# Patient Record
Sex: MALE | Race: OTHER | NOT HISPANIC OR LATINO
[De-identification: names, ages, dates, MRNs, and addresses within clinical notes are randomized per-mention and may not be internally consistent; named-entity substitution may affect disease eponyms.]

---

## 2017-09-06 ENCOUNTER — APPOINTMENT (OUTPATIENT)
Dept: UROLOGY | Facility: CLINIC | Age: 62
End: 2017-09-06

## 2017-09-25 ENCOUNTER — APPOINTMENT (OUTPATIENT)
Dept: UROLOGY | Facility: CLINIC | Age: 62
End: 2017-09-25
Payer: COMMERCIAL

## 2017-09-25 VITALS
SYSTOLIC BLOOD PRESSURE: 127 MMHG | HEART RATE: 88 BPM | DIASTOLIC BLOOD PRESSURE: 84 MMHG | TEMPERATURE: 98.7 F | WEIGHT: 150 LBS | BODY MASS INDEX: 27.6 KG/M2 | HEIGHT: 62 IN

## 2017-09-25 PROCEDURE — 99204 OFFICE O/P NEW MOD 45 MIN: CPT | Mod: 25

## 2017-09-25 PROCEDURE — 51798 US URINE CAPACITY MEASURE: CPT

## 2017-09-26 LAB
APPEARANCE: CLEAR
BACTERIA: NEGATIVE
BILIRUBIN URINE: NEGATIVE
BLOOD URINE: NEGATIVE
COLOR: YELLOW
GLUCOSE QUALITATIVE U: NORMAL MG/DL
HYALINE CASTS: 0 /LPF
KETONES URINE: NEGATIVE
LEUKOCYTE ESTERASE URINE: NEGATIVE
MICROSCOPIC-UA: NORMAL
NITRITE URINE: NEGATIVE
PH URINE: 6
PROTEIN URINE: NEGATIVE MG/DL
RED BLOOD CELLS URINE: 0 /HPF
SPECIFIC GRAVITY URINE: 1.02
SQUAMOUS EPITHELIAL CELLS: 1 /HPF
UROBILINOGEN URINE: 1 MG/DL
WHITE BLOOD CELLS URINE: 0 /HPF

## 2017-09-27 LAB — BACTERIA UR CULT: NORMAL

## 2017-10-25 ENCOUNTER — APPOINTMENT (OUTPATIENT)
Dept: UROLOGY | Facility: CLINIC | Age: 62
End: 2017-10-25
Payer: COMMERCIAL

## 2017-10-25 VITALS
TEMPERATURE: 97.6 F | WEIGHT: 150 LBS | SYSTOLIC BLOOD PRESSURE: 116 MMHG | BODY MASS INDEX: 27.6 KG/M2 | DIASTOLIC BLOOD PRESSURE: 75 MMHG | HEIGHT: 62 IN | HEART RATE: 84 BPM

## 2017-10-25 PROCEDURE — 52000 CYSTOURETHROSCOPY: CPT

## 2018-01-24 ENCOUNTER — APPOINTMENT (OUTPATIENT)
Dept: UROLOGY | Facility: CLINIC | Age: 63
End: 2018-01-24
Payer: COMMERCIAL

## 2018-01-24 VITALS — DIASTOLIC BLOOD PRESSURE: 72 MMHG | TEMPERATURE: 98.4 F | SYSTOLIC BLOOD PRESSURE: 114 MMHG | HEART RATE: 71 BPM

## 2018-01-24 DIAGNOSIS — N35.9 URETHRAL STRICTURE, UNSPECIFIED: ICD-10-CM

## 2018-01-24 PROCEDURE — 99213 OFFICE O/P EST LOW 20 MIN: CPT

## 2018-01-25 PROBLEM — N35.9: Status: ACTIVE | Noted: 2017-09-25

## 2018-01-30 RX ORDER — SILDENAFIL 100 MG/1
100 TABLET, FILM COATED ORAL
Qty: 6 | Refills: 5 | Status: ACTIVE | COMMUNITY
Start: 2018-01-24 | End: 1900-01-01

## 2018-04-13 ENCOUNTER — APPOINTMENT (OUTPATIENT)
Dept: OTOLARYNGOLOGY | Facility: CLINIC | Age: 63
End: 2018-04-13
Payer: COMMERCIAL

## 2018-04-13 PROCEDURE — 92557 COMPREHENSIVE HEARING TEST: CPT

## 2018-04-13 PROCEDURE — 92567 TYMPANOMETRY: CPT

## 2018-04-25 ENCOUNTER — APPOINTMENT (OUTPATIENT)
Dept: OTOLARYNGOLOGY | Facility: CLINIC | Age: 63
End: 2018-04-25
Payer: COMMERCIAL

## 2018-04-25 ENCOUNTER — APPOINTMENT (OUTPATIENT)
Dept: OTOLARYNGOLOGY | Facility: CLINIC | Age: 63
End: 2018-04-25

## 2018-04-25 DIAGNOSIS — H70.90 UNSPECIFIED MASTOIDITIS, UNSPECIFIED EAR: ICD-10-CM

## 2018-04-25 DIAGNOSIS — H61.23 IMPACTED CERUMEN, BILATERAL: ICD-10-CM

## 2018-04-25 PROCEDURE — 99203 OFFICE O/P NEW LOW 30 MIN: CPT | Mod: 25

## 2018-04-25 PROCEDURE — 69220 CLEAN OUT MASTOID CAVITY: CPT | Mod: LT

## 2018-04-25 PROCEDURE — 69210 REMOVE IMPACTED EAR WAX UNI: CPT | Mod: RT

## 2018-06-04 ENCOUNTER — APPOINTMENT (OUTPATIENT)
Dept: UROLOGY | Facility: CLINIC | Age: 63
End: 2018-06-04

## 2018-07-02 ENCOUNTER — APPOINTMENT (OUTPATIENT)
Dept: UROLOGY | Facility: CLINIC | Age: 63
End: 2018-07-02
Payer: COMMERCIAL

## 2018-07-02 VITALS — SYSTOLIC BLOOD PRESSURE: 124 MMHG | DIASTOLIC BLOOD PRESSURE: 76 MMHG | HEART RATE: 90 BPM | TEMPERATURE: 98.5 F

## 2018-07-02 PROCEDURE — 99213 OFFICE O/P EST LOW 20 MIN: CPT

## 2018-07-03 LAB
PSA FREE FLD-MCNC: 13.6
PSA FREE SERPL-MCNC: 0.09 NG/ML
PSA SERPL-MCNC: 0.66 NG/ML

## 2018-07-05 ENCOUNTER — RESULT REVIEW (OUTPATIENT)
Age: 63
End: 2018-07-05

## 2018-08-17 ENCOUNTER — RESULT REVIEW (OUTPATIENT)
Age: 63
End: 2018-08-17

## 2018-08-17 ENCOUNTER — APPOINTMENT (OUTPATIENT)
Dept: GASTROENTEROLOGY | Facility: CLINIC | Age: 63
End: 2018-08-17
Payer: COMMERCIAL

## 2018-08-17 PROCEDURE — 45385 COLONOSCOPY W/LESION REMOVAL: CPT | Mod: 33

## 2019-01-28 ENCOUNTER — APPOINTMENT (OUTPATIENT)
Dept: UROLOGY | Facility: CLINIC | Age: 64
End: 2019-01-28
Payer: COMMERCIAL

## 2019-01-28 VITALS — SYSTOLIC BLOOD PRESSURE: 131 MMHG | HEART RATE: 74 BPM | DIASTOLIC BLOOD PRESSURE: 73 MMHG | TEMPERATURE: 97.7 F

## 2019-01-28 DIAGNOSIS — N52.9 MALE ERECTILE DYSFUNCTION, UNSPECIFIED: ICD-10-CM

## 2019-01-28 PROCEDURE — 99213 OFFICE O/P EST LOW 20 MIN: CPT | Mod: 25

## 2019-01-28 PROCEDURE — 51798 US URINE CAPACITY MEASURE: CPT

## 2019-01-28 NOTE — ASSESSMENT
[FreeTextEntry1] : 64yo male with history of urethral stricture disease, BPH \par No stricture noted on cystoscopy in October 2017\par Normal SONG, PSA in July 2018\par Continue tamsulosin BID\par Recommend trial of Detrol 2mg daily for frequency and urgency\par Reviewed indications, side effects\par RTC 3 months

## 2019-01-28 NOTE — PHYSICAL EXAM
[General Appearance - Well Developed] : well developed [General Appearance - Well Nourished] : well nourished [Normal Appearance] : normal appearance [Well Groomed] : well groomed [General Appearance - In No Acute Distress] : no acute distress [Abdomen Soft] : soft [Abdomen Tenderness] : non-tender [Costovertebral Angle Tenderness] : no ~M costovertebral angle tenderness [FreeTextEntry1] : PVR = 0cc [Oriented To Time, Place, And Person] : oriented to person, place, and time [Affect] : the affect was normal [Mood] : the mood was normal [Not Anxious] : not anxious [Normal Station and Gait] : the gait and station were normal for the patient's age [No Focal Deficits] : no focal deficits

## 2019-01-28 NOTE — LETTER BODY
[Dear  ___] : Dear  [unfilled], [Courtesy Letter:] : I had the pleasure of seeing your patient, [unfilled], in my office today. [Please see my note below.] : Please see my note below. [Consult Closing:] : Thank you very much for allowing me to participate in the care of this patient.  If you have any questions, please do not hesitate to contact me. [Sincerely,] : Sincerely, [FreeTextEntry2] : Deandre Felix MD\par [address]\[place] [address]  [FreeTextEntry3] : Alexis Amaral MD\par Urologic Oncology\par Department of Urology\par [facility]\par \par \par [employer] at [facility] \par \par

## 2019-01-28 NOTE — HISTORY OF PRESENT ILLNESS
[FreeTextEntry1] : This is a 63yo male with complicated urological history, previously followed by Dr. Kelsey Coy. In the past year he has undergone a DVIU for idiopathic urethral stricture and right hydrocele repair. He currently complains of worsening lower urinary tract symptoms including postvoid dribbling and erectile dysfunction. He has been taking tamsulosin for the last several months which has helped his urinary symptoms but he is concerned about postvoid dribbling. \par \par 1/24/18 Here for f/u. Cysto in October was unremarkable, no stricture found. He continues to complain of urinary frequency and urgency but satisfied with tamsulosin. He is interested in treatment for erectile dysfunction. \par \par 7/02/18 Here for f/u. Doing well, he has increased tamsulosin to BID and this has helped a lot. \par \par 1/28/19 Here for f/u. Primary complaint is severe frequency. Flow is OK. Feels he is emptying well.  [Urinary Urgency] : urinary urgency [Urinary Frequency] : urinary frequency [Nocturia] : nocturia [None] : None

## 2019-01-29 LAB
APPEARANCE: CLEAR
BACTERIA: NEGATIVE
BILIRUBIN URINE: NEGATIVE
BLOOD URINE: NEGATIVE
COLOR: YELLOW
GLUCOSE QUALITATIVE U: NEGATIVE MG/DL
HYALINE CASTS: 3 /LPF
KETONES URINE: NEGATIVE
LEUKOCYTE ESTERASE URINE: NEGATIVE
MICROSCOPIC-UA: NORMAL
NITRITE URINE: NEGATIVE
PH URINE: 5
PROTEIN URINE: NEGATIVE MG/DL
RED BLOOD CELLS URINE: 2 /HPF
SPECIFIC GRAVITY URINE: 1.02
SQUAMOUS EPITHELIAL CELLS: 1 /HPF
UROBILINOGEN URINE: NEGATIVE MG/DL
WHITE BLOOD CELLS URINE: 1 /HPF

## 2019-01-30 LAB — BACTERIA UR CULT: NORMAL

## 2019-04-29 ENCOUNTER — APPOINTMENT (OUTPATIENT)
Dept: UROLOGY | Facility: CLINIC | Age: 64
End: 2019-04-29
Payer: COMMERCIAL

## 2019-04-29 VITALS — SYSTOLIC BLOOD PRESSURE: 124 MMHG | TEMPERATURE: 97.4 F | HEART RATE: 87 BPM | DIASTOLIC BLOOD PRESSURE: 78 MMHG

## 2019-04-29 DIAGNOSIS — N99.114 POSTPROCEDURAL URETHRAL STRICTURE, MALE, UNSPECIFIED: ICD-10-CM

## 2019-04-29 PROCEDURE — 99213 OFFICE O/P EST LOW 20 MIN: CPT | Mod: 25

## 2019-04-29 PROCEDURE — 51798 US URINE CAPACITY MEASURE: CPT

## 2019-04-29 RX ORDER — TAMSULOSIN HYDROCHLORIDE 0.4 MG/1
0.4 CAPSULE ORAL
Qty: 180 | Refills: 3 | Status: ACTIVE | COMMUNITY
Start: 2017-10-25 | End: 1900-01-01

## 2019-04-29 NOTE — HISTORY OF PRESENT ILLNESS
[FreeTextEntry1] : This is a 61yo male with complicated urological history, previously followed by Dr. Kelsey Coy. In the past year he has undergone a DVIU for idiopathic urethral stricture and right hydrocele repair. He currently complains of worsening lower urinary tract symptoms including postvoid dribbling and erectile dysfunction. He has been taking tamsulosin for the last several months which has helped his urinary symptoms but he is concerned about postvoid dribbling. \par \par 1/24/18 Here for f/u. Cysto in October was unremarkable, no stricture found. He continues to complain of urinary frequency and urgency but satisfied with tamsulosin. He is interested in treatment for erectile dysfunction. \par \par 7/02/18 Here for f/u. Doing well, he has increased tamsulosin to BID and this has helped a lot. \par \par 1/28/19 Here for f/u. Primary complaint is severe frequency. Flow is OK. Feels he is emptying well. \par \par 4/29/19 Here for f/u. Primary complaint is nocturia, flow is OK. Emptying well.  [Urinary Urgency] : urinary urgency [Nocturia] : nocturia [None] : None

## 2019-04-29 NOTE — ASSESSMENT
[FreeTextEntry1] : 64yo male with history of urethral stricture disease, BPH \par No stricture noted on cystoscopy in October 2017\par Normal SONG, PSA in July 2018\par Continue tamsulosin BID\par Detrol 2mg daily helped daytime symptoms but still with nocturia\par Will increase Detrol to 4mg daily\par RTC 3 months

## 2019-04-29 NOTE — LETTER BODY
[Dear  ___] : Dear  [unfilled], [Courtesy Letter:] : I had the pleasure of seeing your patient, [unfilled], in my office today. [Please see my note below.] : Please see my note below. [Consult Closing:] : Thank you very much for allowing me to participate in the care of this patient.  If you have any questions, please do not hesitate to contact me. [Sincerely,] : Sincerely, [FreeTextEntry2] : Deandre Felix MD\par [address]\[place] [address]  [FreeTextEntry3] : Alexis Amaral MD\par Urologic Oncology\par Department of Urology\par [facility]\par \par \par [employer] at [facility] \par \par

## 2019-04-29 NOTE — PHYSICAL EXAM
[General Appearance - Well Developed] : well developed [General Appearance - Well Nourished] : well nourished [Normal Appearance] : normal appearance [Well Groomed] : well groomed [General Appearance - In No Acute Distress] : no acute distress [Abdomen Soft] : soft [Abdomen Tenderness] : non-tender [Costovertebral Angle Tenderness] : no ~M costovertebral angle tenderness [FreeTextEntry1] : PVR = 14cc [Oriented To Time, Place, And Person] : oriented to person, place, and time [Affect] : the affect was normal [Mood] : the mood was normal [Not Anxious] : not anxious [Normal Station and Gait] : the gait and station were normal for the patient's age [No Focal Deficits] : no focal deficits

## 2019-07-29 ENCOUNTER — APPOINTMENT (OUTPATIENT)
Dept: UROLOGY | Facility: CLINIC | Age: 64
End: 2019-07-29
Payer: COMMERCIAL

## 2019-07-29 VITALS — DIASTOLIC BLOOD PRESSURE: 84 MMHG | TEMPERATURE: 98.3 F | HEART RATE: 82 BPM | SYSTOLIC BLOOD PRESSURE: 125 MMHG

## 2019-07-29 PROCEDURE — 51798 US URINE CAPACITY MEASURE: CPT

## 2019-07-29 PROCEDURE — 99213 OFFICE O/P EST LOW 20 MIN: CPT | Mod: 25

## 2019-07-29 NOTE — ASSESSMENT
[FreeTextEntry1] : 64yo male with history of urethral stricture disease, BPH \par No stricture noted on cystoscopy in October 2017\par Normal SONG, PSA in July 2018\par Continue tamsulosin BID\par Continue Detrol to 4mg daily but recommend he try taking medication earlier in the day \par RTC 3 months

## 2019-07-29 NOTE — LETTER BODY
[Dear  ___] : Dear  [unfilled], [Courtesy Letter:] : I had the pleasure of seeing your patient, [unfilled], in my office today. [Please see my note below.] : Please see my note below. [Consult Closing:] : Thank you very much for allowing me to participate in the care of this patient.  If you have any questions, please do not hesitate to contact me. [Sincerely,] : Sincerely, [FreeTextEntry2] : Deandre Felix MD\par [address]\[place] [address]  [FreeTextEntry3] : Alexis Amaral MD\par Urologic Oncology\par Department of Urology\par [facility]\par \par \par [employer] [facility] \par \par

## 2019-07-29 NOTE — PHYSICAL EXAM
[General Appearance - Well Nourished] : well nourished [General Appearance - Well Developed] : well developed [Normal Appearance] : normal appearance [Well Groomed] : well groomed [General Appearance - In No Acute Distress] : no acute distress [Abdomen Soft] : soft [Abdomen Tenderness] : non-tender [FreeTextEntry1] : PVR = 0cc [Costovertebral Angle Tenderness] : no ~M costovertebral angle tenderness [Oriented To Time, Place, And Person] : oriented to person, place, and time [Affect] : the affect was normal [Not Anxious] : not anxious [Mood] : the mood was normal [Normal Station and Gait] : the gait and station were normal for the patient's age [No Focal Deficits] : no focal deficits

## 2019-07-29 NOTE — HISTORY OF PRESENT ILLNESS
[FreeTextEntry1] : This is a 61yo male with complicated urological history, previously followed by Dr. Kelsey Coy. In the past year he has undergone a DVIU for idiopathic urethral stricture and right hydrocele repair. He currently complains of worsening lower urinary tract symptoms including postvoid dribbling and erectile dysfunction. He has been taking tamsulosin for the last several months which has helped his urinary symptoms but he is concerned about postvoid dribbling. \par \par 1/24/18 Here for f/u. Cysto in October was unremarkable, no stricture found. He continues to complain of urinary frequency and urgency but satisfied with tamsulosin. He is interested in treatment for erectile dysfunction. \par \par 7/02/18 Here for f/u. Doing well, he has increased tamsulosin to BID and this has helped a lot. \par \par 1/28/19 Here for f/u. Primary complaint is severe frequency. Flow is OK. Feels he is emptying well. \par \par 4/29/19 Here for f/u. Primary complaint is nocturia, flow is OK. Emptying well. \par \par 7/29/19 Here for f/u. Primary complaint is still nocturia, day time symptoms are well controlled. He has been taking the Detrol before bedtime.  [Urinary Urgency] : no urinary urgency [Urinary Frequency] : no urinary frequency [Nocturia] : nocturia [Straining] : no straining [Weak Stream] : no weak stream [Intermittency] : no intermittency [None] : None

## 2019-10-28 ENCOUNTER — APPOINTMENT (OUTPATIENT)
Dept: UROLOGY | Facility: CLINIC | Age: 64
End: 2019-10-28
Payer: COMMERCIAL

## 2019-10-28 VITALS — SYSTOLIC BLOOD PRESSURE: 99 MMHG | TEMPERATURE: 98.1 F | DIASTOLIC BLOOD PRESSURE: 63 MMHG | HEART RATE: 81 BPM

## 2019-10-28 PROCEDURE — 99213 OFFICE O/P EST LOW 20 MIN: CPT

## 2019-10-28 RX ORDER — TOLTERODINE TARTRATE 4 MG/1
4 CAPSULE, EXTENDED RELEASE ORAL
Qty: 90 | Refills: 3 | Status: DISCONTINUED | COMMUNITY
Start: 2019-01-28 | End: 2019-10-28

## 2019-10-29 LAB
APPEARANCE: CLEAR
BACTERIA UR CULT: NORMAL
BACTERIA: NEGATIVE
BILIRUBIN URINE: NEGATIVE
BLOOD URINE: NEGATIVE
COLOR: YELLOW
GLUCOSE QUALITATIVE U: NEGATIVE
HYALINE CASTS: 0 /LPF
KETONES URINE: NEGATIVE
LEUKOCYTE ESTERASE URINE: NEGATIVE
MICROSCOPIC-UA: NORMAL
NITRITE URINE: NEGATIVE
PH URINE: 5.5
PROTEIN URINE: NORMAL
RED BLOOD CELLS URINE: 1 /HPF
SPECIFIC GRAVITY URINE: 1.03
SQUAMOUS EPITHELIAL CELLS: 1 /HPF
UROBILINOGEN URINE: NORMAL
WHITE BLOOD CELLS URINE: 1 /HPF

## 2019-10-29 NOTE — LETTER BODY
[Dear  ___] : Dear  [unfilled], [Courtesy Letter:] : I had the pleasure of seeing your patient, [unfilled], in my office today. [Please see my note below.] : Please see my note below. [Consult Closing:] : Thank you very much for allowing me to participate in the care of this patient.  If you have any questions, please do not hesitate to contact me. [Sincerely,] : Sincerely, [FreeTextEntry2] : Deandre Felix MD\par [address]\[place] [address]  [FreeTextEntry3] : Alexis Amaral MD\par Urologic Oncology\par Department of Urology\par [facility]\par \par \par [employer] [facility] \par \par

## 2019-10-29 NOTE — ASSESSMENT
[FreeTextEntry1] : 63yo male with history of urethral stricture disease, BPH \par No stricture noted on cystoscopy in October 2017\par Normal SONG, PSA in July 2018\par Continue tamsulosin\par Recommend stop Detrol. Trial Toviaz 8mg \par Reviewed management options for dry mouth \par F/u 3 months for reevaluation

## 2019-10-29 NOTE — HISTORY OF PRESENT ILLNESS
[FreeTextEntry1] : This is a 63yo male with complicated urological history, previously followed by Dr. Kelsey Coy. In the past year he has undergone a DVIU for idiopathic urethral stricture and right hydrocele repair. He currently complains of worsening lower urinary tract symptoms including postvoid dribbling and erectile dysfunction. He has been taking tamsulosin for the last several months which has helped his urinary symptoms but he is concerned about postvoid dribbling. \par \par 1/24/18 Here for f/u. Cysto in October was unremarkable, no stricture found. He continues to complain of urinary frequency and urgency but satisfied with tamsulosin. He is interested in treatment for erectile dysfunction. \par \par 7/02/18 Here for f/u. Doing well, he has increased tamsulosin to BID and this has helped a lot. \par \par 1/28/19 Here for f/u. Primary complaint is severe frequency. Flow is OK. Feels he is emptying well. \par \par 4/29/19 Here for f/u. Primary complaint is nocturia, flow is OK. Emptying well. \par \par 7/29/19 Here for f/u. Primary complaint is still nocturia, day time symptoms are well controlled. He has been taking the Detrol before bedtime. \par \par 10/28/19 Here for f/u. Continues to complain of nocturia and frequency. Cannot tolerate Detrol due to dry mouth.  [Urinary Urgency] : no urinary urgency [Urinary Frequency] : urinary frequency [Nocturia] : nocturia [Straining] : no straining [Weak Stream] : no weak stream [Intermittency] : no intermittency [None] : None

## 2019-10-29 NOTE — PHYSICAL EXAM
[General Appearance - Well Developed] : well developed [General Appearance - Well Nourished] : well nourished [Normal Appearance] : normal appearance [Well Groomed] : well groomed [General Appearance - In No Acute Distress] : no acute distress [Abdomen Soft] : soft [Abdomen Tenderness] : non-tender [Costovertebral Angle Tenderness] : no ~M costovertebral angle tenderness [Urinary Bladder Findings] : the bladder was normal on palpation [Oriented To Time, Place, And Person] : oriented to person, place, and time [Affect] : the affect was normal [Mood] : the mood was normal [Not Anxious] : not anxious [Normal Station and Gait] : the gait and station were normal for the patient's age [No Focal Deficits] : no focal deficits

## 2020-01-13 ENCOUNTER — APPOINTMENT (OUTPATIENT)
Dept: UROLOGY | Facility: CLINIC | Age: 65
End: 2020-01-13
Payer: COMMERCIAL

## 2020-01-13 VITALS — HEART RATE: 78 BPM | SYSTOLIC BLOOD PRESSURE: 108 MMHG | TEMPERATURE: 97.4 F | DIASTOLIC BLOOD PRESSURE: 67 MMHG

## 2020-01-13 PROCEDURE — 99213 OFFICE O/P EST LOW 20 MIN: CPT

## 2020-01-13 RX ORDER — FESOTERODINE FUMARATE 8 MG/1
8 TABLET, FILM COATED, EXTENDED RELEASE ORAL
Qty: 30 | Refills: 5 | Status: DISCONTINUED | COMMUNITY
Start: 2019-10-28 | End: 2020-01-13

## 2020-01-13 NOTE — ASSESSMENT
[FreeTextEntry1] : 65yo male with history of urethral stricture disease, BPH \par No stricture noted on cystoscopy in October 2017\par Normal PVRs\par Primarily with irritative symptoms\par Normal SONG, PSA in July 2018\par Continue tamsulosin\par Continue Toviaz 8mg \par Symptoms have mostly been refractory to medications\par Recommend UDS testing. Will refer to Dr. Muniz for further workup

## 2020-01-13 NOTE — HISTORY OF PRESENT ILLNESS
[FreeTextEntry1] : This is a 61yo male with complicated urological history, previously followed by Dr. Kelsey Coy. In the past year he has undergone a DVIU for idiopathic urethral stricture and right hydrocele repair. He currently complains of worsening lower urinary tract symptoms including postvoid dribbling and erectile dysfunction. He has been taking tamsulosin for the last several months which has helped his urinary symptoms but he is concerned about postvoid dribbling. \par \par 1/24/18 Here for f/u. Cysto in October was unremarkable, no stricture found. He continues to complain of urinary frequency and urgency but satisfied with tamsulosin. He is interested in treatment for erectile dysfunction. \par \par 7/02/18 Here for f/u. Doing well, he has increased tamsulosin to BID and this has helped a lot. \par \par 1/28/19 Here for f/u. Primary complaint is severe frequency. Flow is OK. Feels he is emptying well. \par \par 4/29/19 Here for f/u. Primary complaint is nocturia, flow is OK. Emptying well. \par \par 7/29/19 Here for f/u. Primary complaint is still nocturia, day time symptoms are well controlled. He has been taking the Detrol before bedtime. \par \par 10/28/19 Here for f/u. Continues to complain of nocturia and frequency. Cannot tolerate Detrol due to dry mouth. \par \par 1/13/20 Here for f/u. Modest benefit from Toviaz but still with bothersome frequency and nocturia.  [Urinary Urgency] : no urinary urgency [Urinary Frequency] : urinary frequency [Nocturia] : nocturia [Straining] : no straining [Weak Stream] : no weak stream [Intermittency] : no intermittency [None] : None

## 2020-01-13 NOTE — LETTER BODY
[Dear  ___] : Dear  [unfilled], [Please see my note below.] : Please see my note below. [Courtesy Letter:] : I had the pleasure of seeing your patient, [unfilled], in my office today. [Consult Closing:] : Thank you very much for allowing me to participate in the care of this patient.  If you have any questions, please do not hesitate to contact me. [Sincerely,] : Sincerely, [FreeTextEntry2] : Deandre Felix MD\par [address]\[place] [address]  [FreeTextEntry3] : Alexis Amaral MD\par Urologic Oncology\par Department of Urology\par [facility]\par \par \par [employer] at [facility] \par \par

## 2020-01-13 NOTE — PHYSICAL EXAM
[General Appearance - Well Nourished] : well nourished [General Appearance - Well Developed] : well developed [Well Groomed] : well groomed [General Appearance - In No Acute Distress] : no acute distress [Normal Appearance] : normal appearance [Abdomen Tenderness] : non-tender [Abdomen Soft] : soft [Costovertebral Angle Tenderness] : no ~M costovertebral angle tenderness [Oriented To Time, Place, And Person] : oriented to person, place, and time [Urinary Bladder Findings] : the bladder was normal on palpation [Mood] : the mood was normal [Not Anxious] : not anxious [Affect] : the affect was normal [Normal Station and Gait] : the gait and station were normal for the patient's age [No Focal Deficits] : no focal deficits

## 2020-01-14 LAB
APPEARANCE: CLEAR
BACTERIA: NEGATIVE
BILIRUBIN URINE: NEGATIVE
BLOOD URINE: NEGATIVE
COLOR: YELLOW
GLUCOSE QUALITATIVE U: NEGATIVE
HYALINE CASTS: 0 /LPF
KETONES URINE: NEGATIVE
LEUKOCYTE ESTERASE URINE: NEGATIVE
MICROSCOPIC-UA: NORMAL
NITRITE URINE: NEGATIVE
PH URINE: 5.5
PROTEIN URINE: NORMAL
RED BLOOD CELLS URINE: 1 /HPF
SPECIFIC GRAVITY URINE: 1.03
SQUAMOUS EPITHELIAL CELLS: 1 /HPF
UROBILINOGEN URINE: NORMAL
WHITE BLOOD CELLS URINE: 0 /HPF

## 2020-01-15 LAB — BACTERIA UR CULT: NORMAL

## 2020-01-31 ENCOUNTER — APPOINTMENT (OUTPATIENT)
Dept: UROLOGY | Facility: CLINIC | Age: 65
End: 2020-01-31
Payer: COMMERCIAL

## 2020-01-31 DIAGNOSIS — Z00.00 ENCOUNTER FOR GENERAL ADULT MEDICAL EXAMINATION W/OUT ABNORMAL FINDINGS: ICD-10-CM

## 2020-01-31 DIAGNOSIS — N40.1 BENIGN PROSTATIC HYPERPLASIA WITH LOWER URINARY TRACT SYMPMS: ICD-10-CM

## 2020-01-31 DIAGNOSIS — N13.8 BENIGN PROSTATIC HYPERPLASIA WITH LOWER URINARY TRACT SYMPMS: ICD-10-CM

## 2020-01-31 PROCEDURE — 99214 OFFICE O/P EST MOD 30 MIN: CPT

## 2020-02-03 LAB
BILIRUB UR QL STRIP: NORMAL
CLARITY UR: CLEAR
COLLECTION METHOD: NORMAL
GLUCOSE UR-MCNC: NORMAL
HCG UR QL: 2 EU/DL
HGB UR QL STRIP.AUTO: NORMAL
KETONES UR-MCNC: NORMAL
LEUKOCYTE ESTERASE UR QL STRIP: NORMAL
NITRITE UR QL STRIP: NORMAL
PH UR STRIP: 7
PROT UR STRIP-MCNC: NORMAL
SP GR UR STRIP: 1.02

## 2020-03-13 ENCOUNTER — RESULT CHARGE (OUTPATIENT)
Age: 65
End: 2020-03-13

## 2020-03-13 ENCOUNTER — APPOINTMENT (OUTPATIENT)
Dept: UROLOGY | Facility: CLINIC | Age: 65
End: 2020-03-13
Payer: COMMERCIAL

## 2020-03-13 VITALS — DIASTOLIC BLOOD PRESSURE: 66 MMHG | SYSTOLIC BLOOD PRESSURE: 108 MMHG | TEMPERATURE: 98.1 F

## 2020-03-13 LAB
BILIRUB UR QL STRIP: NORMAL
CLARITY UR: CLEAR
COLLECTION METHOD: NORMAL
GLUCOSE UR-MCNC: NORMAL
HCG UR QL: 0.2 EU/DL
HGB UR QL STRIP.AUTO: NORMAL
KETONES UR-MCNC: NORMAL
LEUKOCYTE ESTERASE UR QL STRIP: NORMAL
NITRITE UR QL STRIP: NORMAL
PH UR STRIP: 5.5
PROT UR STRIP-MCNC: NORMAL
SP GR UR STRIP: 1.02

## 2020-03-13 PROCEDURE — 51797 INTRAABDOMINAL PRESSURE TEST: CPT

## 2020-03-13 PROCEDURE — 51728 CYSTOMETROGRAM W/VP: CPT

## 2020-03-13 PROCEDURE — 51741 ELECTRO-UROFLOWMETRY FIRST: CPT

## 2020-03-13 PROCEDURE — 52001 CYSTO W/IRRG&EVAC MLT CLOTS: CPT

## 2020-03-13 PROCEDURE — 51784 ANAL/URINARY MUSCLE STUDY: CPT

## 2020-03-20 ENCOUNTER — APPOINTMENT (OUTPATIENT)
Dept: UROLOGY | Facility: CLINIC | Age: 65
End: 2020-03-20

## 2020-04-01 ENCOUNTER — APPOINTMENT (OUTPATIENT)
Dept: UROLOGY | Facility: CLINIC | Age: 65
End: 2020-04-01
Payer: COMMERCIAL

## 2020-04-01 PROCEDURE — 99214 OFFICE O/P EST MOD 30 MIN: CPT | Mod: 95

## 2020-05-27 ENCOUNTER — APPOINTMENT (OUTPATIENT)
Dept: UROLOGY | Facility: CLINIC | Age: 65
End: 2020-05-27

## 2020-05-29 ENCOUNTER — APPOINTMENT (OUTPATIENT)
Dept: UROLOGY | Facility: CLINIC | Age: 65
End: 2020-05-29
Payer: COMMERCIAL

## 2020-05-29 VITALS — SYSTOLIC BLOOD PRESSURE: 104 MMHG | DIASTOLIC BLOOD PRESSURE: 68 MMHG | HEART RATE: 73 BPM

## 2020-05-29 VITALS — HEIGHT: 62 IN | TEMPERATURE: 97.1 F

## 2020-05-29 PROCEDURE — 99213 OFFICE O/P EST LOW 20 MIN: CPT

## 2020-06-26 ENCOUNTER — APPOINTMENT (OUTPATIENT)
Dept: UROLOGY | Facility: CLINIC | Age: 65
End: 2020-06-26

## 2020-09-09 ENCOUNTER — LABORATORY RESULT (OUTPATIENT)
Age: 65
End: 2020-09-09

## 2020-09-10 ENCOUNTER — TRANSCRIPTION ENCOUNTER (OUTPATIENT)
Age: 65
End: 2020-09-10

## 2020-09-10 LAB — SARS-COV-2 N GENE NPH QL NAA+PROBE: NOT DETECTED

## 2020-09-11 ENCOUNTER — APPOINTMENT (OUTPATIENT)
Dept: UROLOGY | Facility: AMBULATORY SURGERY CENTER | Age: 65
End: 2020-09-11

## 2020-09-11 ENCOUNTER — OUTPATIENT (OUTPATIENT)
Dept: OUTPATIENT SERVICES | Facility: HOSPITAL | Age: 65
LOS: 1 days | Discharge: HOME | End: 2020-09-11
Payer: COMMERCIAL

## 2020-09-11 DIAGNOSIS — Z98.89 OTHER SPECIFIED POSTPROCEDURAL STATES: Chronic | ICD-10-CM

## 2020-09-11 PROCEDURE — 52287 CYSTOSCOPY CHEMODENERVATION: CPT

## 2020-09-18 ENCOUNTER — APPOINTMENT (OUTPATIENT)
Dept: UROLOGY | Facility: CLINIC | Age: 65
End: 2020-09-18
Payer: COMMERCIAL

## 2020-09-18 VITALS — SYSTOLIC BLOOD PRESSURE: 121 MMHG | TEMPERATURE: 98.1 F | HEART RATE: 87 BPM | DIASTOLIC BLOOD PRESSURE: 80 MMHG

## 2020-09-18 PROCEDURE — 99213 OFFICE O/P EST LOW 20 MIN: CPT

## 2020-09-25 ENCOUNTER — APPOINTMENT (OUTPATIENT)
Dept: OTOLARYNGOLOGY | Facility: CLINIC | Age: 65
End: 2020-09-25
Payer: COMMERCIAL

## 2020-09-25 PROCEDURE — 92557 COMPREHENSIVE HEARING TEST: CPT

## 2020-09-25 PROCEDURE — 92550 TYMPANOMETRY & REFLEX THRESH: CPT

## 2020-10-23 ENCOUNTER — APPOINTMENT (OUTPATIENT)
Dept: UROLOGY | Facility: CLINIC | Age: 65
End: 2020-10-23
Payer: COMMERCIAL

## 2020-10-23 VITALS
HEART RATE: 76 BPM | SYSTOLIC BLOOD PRESSURE: 122 MMHG | TEMPERATURE: 97.6 F | BODY MASS INDEX: 34.23 KG/M2 | WEIGHT: 186 LBS | DIASTOLIC BLOOD PRESSURE: 83 MMHG | OXYGEN SATURATION: 96 % | HEIGHT: 62 IN

## 2020-10-23 PROCEDURE — 99072 ADDL SUPL MATRL&STAF TM PHE: CPT

## 2020-10-23 PROCEDURE — 99213 OFFICE O/P EST LOW 20 MIN: CPT

## 2020-10-23 NOTE — ASSESSMENT
[FreeTextEntry1] : \par \par Impression/plan: 65-year-old gentleman who is 1 week status post botulinum toxin injection into the bladder 100 units, nocturia resolved, 50% improvement in daytime frequency. \par \par 1. F/u in 6 months tentatively for repeat Botox, he would like to consider higher dose.

## 2020-10-23 NOTE — HISTORY OF PRESENT ILLNESS
[FreeTextEntry1] : 65-year-old gentleman who is 1 week status post botulinum toxin injection into the bladder 100 units. He states his nighttime voiding has completely resolved. He does have 50% improvement in daytime frequency. Postvoid residual in the office last visit was 71 mL. \par

## 2020-10-23 NOTE — PHYSICAL EXAM
[General Appearance - Well Developed] : well developed [General Appearance - Well Nourished] : well nourished [Normal Appearance] : normal appearance [Well Groomed] : well groomed [General Appearance - In No Acute Distress] : no acute distress [Edema] : no peripheral edema [] : no respiratory distress [Respiration, Rhythm And Depth] : normal respiratory rhythm and effort [Exaggerated Use Of Accessory Muscles For Inspiration] : no accessory muscle use [Oriented To Time, Place, And Person] : oriented to person, place, and time [Normal Station and Gait] : the gait and station were normal for the patient's age

## 2021-04-23 ENCOUNTER — APPOINTMENT (OUTPATIENT)
Dept: UROLOGY | Facility: CLINIC | Age: 66
End: 2021-04-23
Payer: COMMERCIAL

## 2021-04-23 ENCOUNTER — APPOINTMENT (OUTPATIENT)
Dept: UROLOGY | Facility: CLINIC | Age: 66
End: 2021-04-23

## 2021-04-23 VITALS — TEMPERATURE: 97.3 F | HEART RATE: 60 BPM | DIASTOLIC BLOOD PRESSURE: 81 MMHG | SYSTOLIC BLOOD PRESSURE: 125 MMHG

## 2021-04-23 PROCEDURE — 99214 OFFICE O/P EST MOD 30 MIN: CPT

## 2021-04-23 PROCEDURE — 99072 ADDL SUPL MATRL&STAF TM PHE: CPT

## 2021-04-30 ENCOUNTER — NON-APPOINTMENT (OUTPATIENT)
Age: 66
End: 2021-04-30

## 2021-04-30 DIAGNOSIS — N39.0 URINARY TRACT INFECTION, SITE NOT SPECIFIED: ICD-10-CM

## 2021-04-30 LAB — BACTERIA UR CULT: ABNORMAL

## 2021-04-30 RX ORDER — SULFAMETHOXAZOLE AND TRIMETHOPRIM 800; 160 MG/1; MG/1
800-160 TABLET ORAL
Qty: 6 | Refills: 0 | Status: COMPLETED | COMMUNITY
Start: 2021-04-30 | End: 2021-05-09

## 2021-05-04 ENCOUNTER — APPOINTMENT (OUTPATIENT)
Dept: UROLOGY | Facility: AMBULATORY SURGERY CENTER | Age: 66
End: 2021-05-04

## 2021-05-07 ENCOUNTER — LABORATORY RESULT (OUTPATIENT)
Age: 66
End: 2021-05-07

## 2021-05-09 ENCOUNTER — TRANSCRIPTION ENCOUNTER (OUTPATIENT)
Age: 66
End: 2021-05-09

## 2021-05-10 ENCOUNTER — OUTPATIENT (OUTPATIENT)
Dept: OUTPATIENT SERVICES | Facility: HOSPITAL | Age: 66
LOS: 1 days | Discharge: HOME | End: 2021-05-10
Payer: COMMERCIAL

## 2021-05-10 ENCOUNTER — APPOINTMENT (OUTPATIENT)
Dept: UROLOGY | Facility: AMBULATORY SURGERY CENTER | Age: 66
End: 2021-05-10

## 2021-05-10 DIAGNOSIS — Z98.89 OTHER SPECIFIED POSTPROCEDURAL STATES: Chronic | ICD-10-CM

## 2021-05-10 PROCEDURE — 52287 CYSTOSCOPY CHEMODENERVATION: CPT

## 2021-05-12 ENCOUNTER — APPOINTMENT (OUTPATIENT)
Dept: UROLOGY | Facility: CLINIC | Age: 66
End: 2021-05-12
Payer: COMMERCIAL

## 2021-05-12 VITALS — HEART RATE: 69 BPM | DIASTOLIC BLOOD PRESSURE: 73 MMHG | TEMPERATURE: 97.2 F | SYSTOLIC BLOOD PRESSURE: 123 MMHG

## 2021-05-12 PROCEDURE — 99024 POSTOP FOLLOW-UP VISIT: CPT

## 2021-05-12 NOTE — HISTORY OF PRESENT ILLNESS
[FreeTextEntry1] : 64 yo gentleman 2 days s/p Botox injection, post-op urinary retention. Fill and pull performed, 150 ml instilled, voided 120 ml, PVR 30 ml. F/u in 1 week for PVR.

## 2021-05-19 ENCOUNTER — APPOINTMENT (OUTPATIENT)
Dept: UROLOGY | Facility: CLINIC | Age: 66
End: 2021-05-19
Payer: COMMERCIAL

## 2021-05-19 VITALS — TEMPERATURE: 94 F | DIASTOLIC BLOOD PRESSURE: 69 MMHG | SYSTOLIC BLOOD PRESSURE: 110 MMHG | HEART RATE: 76 BPM

## 2021-05-19 PROCEDURE — 99213 OFFICE O/P EST LOW 20 MIN: CPT

## 2021-05-19 NOTE — HISTORY OF PRESENT ILLNESS
[FreeTextEntry1] : 65 year-old male s/p 200 units Botox injection 5/10 present to the office for post-op visit. Patient reports relief of daytime frequency. He states that he goes to the bathroom 3x during the day and on average 4x during the night. Overall patient is happy with results thus far.  \par \par PVR - 149 ml\par

## 2021-05-19 NOTE — ASSESSMENT
[FreeTextEntry1] : \par \par Impression/plan: 65 year-old male s/p 200 units Botox injection 5/10 present to the office for post-op visit, happy with results thus far for OAB. \par \par 1. F/u 6-9 months to assess OAB symptoms. \par 2. Reduce nighttime fluid intake. \par

## 2021-05-19 NOTE — LETTER BODY
[Dear  ___] : Dear  [unfilled], [Consult Letter:] : I had the pleasure of evaluating your patient, [unfilled]. [Please see my note below.] : Please see my note below. [Consult Closing:] : Thank you very much for allowing me to participate in the care of this patient.  If you have any questions, please do not hesitate to contact me. [Sincerely,] : Sincerely, [FreeTextEntry3] : Neha Muniz MD\par System Director [facility]\par Department of Urology\par [facility] \par   at [facility]\par  of Urology\par [facility] [employer] [facility]\par

## 2021-09-01 ENCOUNTER — APPOINTMENT (OUTPATIENT)
Dept: UROLOGY | Facility: CLINIC | Age: 66
End: 2021-09-01
Payer: COMMERCIAL

## 2021-09-01 VITALS — SYSTOLIC BLOOD PRESSURE: 149 MMHG | HEART RATE: 60 BPM | DIASTOLIC BLOOD PRESSURE: 80 MMHG | TEMPERATURE: 98 F

## 2021-09-01 PROCEDURE — 99214 OFFICE O/P EST MOD 30 MIN: CPT

## 2021-09-01 NOTE — LETTER BODY
[Dear  ___] : Dear  [unfilled], [Courtesy Letter:] : I had the pleasure of seeing your patient, [unfilled], in my office today. [Please see my note below.] : Please see my note below. [Consult Closing:] : Thank you very much for allowing me to participate in the care of this patient.  If you have any questions, please do not hesitate to contact me. [Sincerely,] : Sincerely, [FreeTextEntry3] : Neha Muniz MD\par System Director [facility]\par Department of Urology\par [facility] \par   at [facility]\par  of Urology\par [facility] [employer] at [facility]\par

## 2021-09-01 NOTE — ASSESSMENT
[FreeTextEntry1] : \par \par Impression/plan: 66 year-old male s/p 200 units Botox injection 5/10 present to the office with recurrence of daytime freq, urgency and nocturia x 3-5.\par \par 1. Options for management of the patient's overactive bladder and incontinence discussed at length. These included medical therapy, behavioral modification, bladder retraining, and surgical options. Surgical options for third line therapies reviewed included injection of botulinum toxin versus sacral nerve stimulation therapy. The patient has decided to move forward with sacral nerve stimulation. RBAPC of this procedure discussed at length. Risks including but not limited to infection, bleeding, pain, persistence of voiding dysfunction, nerve damage, lack of efficacy discussed with the patient at length. After above discussed and all questions answered the patient decided to move forward with this procedure. \par \par

## 2021-09-03 LAB — BACTERIA UR CULT: NORMAL

## 2021-10-14 ENCOUNTER — LABORATORY RESULT (OUTPATIENT)
Age: 66
End: 2021-10-14

## 2021-10-14 ENCOUNTER — APPOINTMENT (OUTPATIENT)
Dept: INTERNAL MEDICINE | Facility: CLINIC | Age: 66
End: 2021-10-14
Payer: COMMERCIAL

## 2021-10-14 ENCOUNTER — NON-APPOINTMENT (OUTPATIENT)
Age: 66
End: 2021-10-14

## 2021-10-14 VITALS
SYSTOLIC BLOOD PRESSURE: 110 MMHG | BODY MASS INDEX: 32.02 KG/M2 | DIASTOLIC BLOOD PRESSURE: 70 MMHG | TEMPERATURE: 97.4 F | HEIGHT: 62 IN | OXYGEN SATURATION: 95 % | HEART RATE: 74 BPM | WEIGHT: 174 LBS

## 2021-10-14 PROCEDURE — 99203 OFFICE O/P NEW LOW 30 MIN: CPT

## 2021-10-14 RX ORDER — BISOPROLOL FUMARATE AND HYDROCHLOROTHIAZIDE 2.5; 6.25 MG/1; MG/1
2.5-6.25 TABLET, FILM COATED ORAL DAILY
Refills: 0 | Status: ACTIVE | COMMUNITY
Start: 2021-10-14

## 2021-10-14 RX ORDER — AMLODIPINE BESYLATE 10 MG/1
10 TABLET ORAL DAILY
Qty: 1 | Refills: 1 | Status: ACTIVE | COMMUNITY
Start: 2021-10-14

## 2021-10-14 NOTE — REVIEW OF SYSTEMS
[Shortness Of Breath] : shortness of breath [Cough] : cough [Dyspnea on Exertion] : dyspnea on exertion [Negative] : Heme/Lymph [Wheezing] : no wheezing

## 2021-10-14 NOTE — HISTORY OF PRESENT ILLNESS
[No Pertinent Cardiac History] : no history of aortic stenosis, atrial fibrillation, coronary artery disease, recent myocardial infarction, or implantable device/pacemaker [Asthma] : asthma [No Adverse Anesthesia Reaction] : no adverse anesthesia reaction in self or family member [Moderate (4-6 METs)] : Moderate (4-6 METs) [COPD] : no COPD [Sleep Apnea] : no sleep apnea [Smoker] : not a smoker [Chronic Anticoagulation] : no chronic anticoagulation [Chronic Kidney Disease] : no chronic kidney disease [Diabetes] : no diabetes [FreeTextEntry1] : InterStim implant [FreeTextEntry2] : 10/25/21 [FreeTextEntry3] : Dr. Neha Muniz [FreeTextEntry4] : Mr. AKBAR KEARNEY is a 66 year old male with history of HTN, HLD, overactive bladder and asthma presenting today for Pre-Op Clearance. Pt sees Dr. Deandre Felix as his PCP, but needed clearance in time for his procedure later this month. Pt reports he has increasing asthma symptoms. Sunday night he developed chest tightness, but did not think much of it as Sx typically resolve. Monday morning 10/11/21 Sx were still present so he presented to the ER where he was given multiple nebs with improvement and discharged on prednisone daily for 4 more days. Pt reports he now feels better, but uses his albuterol twice a day. He denies prior hospitalizations or intubations for asthma.  [FreeTextEntry6] : Intermittent chest tightness on exertion. No chest pain, no claudication, no palpitations, no syncope, no orthopnea [FreeTextEntry7] : No known cardiac testing history  [FreeTextEntry8] : reports being able to walk up to his fifth floor apartment without issue

## 2021-10-14 NOTE — ASSESSMENT
[Patient NOT optimized for Surgery at this time] : Patient not optimized for surgery at this time [Cardiology consultation] : Cardiology consultation [Other: _____] : [unfilled] [Continue medications as is] : Continue current medications [FreeTextEntry4] : Mr. AKBAR KEARNEY is a 66 year old male with history of HTN, HLD, Asthma and overactive bladder presenting today for Pre-Op evaluation. Given that he is endorsing Sx consistent with moderate persistent asthma and was in the ED this week for asthma exacerbation he cannot be cleared for elective surgery at this time. Discussed with pt that he needs to see pulmonology to establish better control of his asthma. Discussed with patient he likely needs to be started on an inhaled corticosteroid, but would defer to his PCP. Recommend cardiac clearance prior to procedure given his risk factors (age, HTN, HLD, obesity), ECG changes without previous for comparison, and chest tightness on exertion.

## 2021-10-14 NOTE — PHYSICAL EXAM
[No Acute Distress] : no acute distress [Well Nourished] : well nourished [Normal Sclera/Conjunctiva] : normal sclera/conjunctiva [PERRL] : pupils equal round and reactive to light [No Lymphadenopathy] : no lymphadenopathy [No Edema] : there was no peripheral edema [Normal Posterior Cervical Nodes] : no posterior cervical lymphadenopathy [Normal Anterior Cervical Nodes] : no anterior cervical lymphadenopathy [Normal] : no rash

## 2021-10-15 ENCOUNTER — APPOINTMENT (OUTPATIENT)
Dept: PULMONOLOGY | Facility: CLINIC | Age: 66
End: 2021-10-15
Payer: COMMERCIAL

## 2021-10-15 VITALS
TEMPERATURE: 97.2 F | BODY MASS INDEX: 31.28 KG/M2 | DIASTOLIC BLOOD PRESSURE: 57 MMHG | HEIGHT: 62 IN | OXYGEN SATURATION: 96 % | SYSTOLIC BLOOD PRESSURE: 90 MMHG | RESPIRATION RATE: 12 BRPM | WEIGHT: 170 LBS | HEART RATE: 77 BPM

## 2021-10-15 DIAGNOSIS — J45.30 MILD PERSISTENT ASTHMA, UNCOMPLICATED: ICD-10-CM

## 2021-10-15 DIAGNOSIS — Z01.818 ENCOUNTER FOR OTHER PREPROCEDURAL EXAMINATION: ICD-10-CM

## 2021-10-15 LAB
ALBUMIN SERPL ELPH-MCNC: 4.1 G/DL
ALP BLD-CCNC: 122 U/L
ALT SERPL-CCNC: 30 U/L
ANION GAP SERPL CALC-SCNC: 14 MMOL/L
APPEARANCE: CLEAR
APTT BLD: 35.3 SEC
AST SERPL-CCNC: 28 U/L
BACTERIA: NEGATIVE
BASOPHILS # BLD AUTO: 0.02 K/UL
BASOPHILS NFR BLD AUTO: 0.5 %
BILIRUB SERPL-MCNC: 0.5 MG/DL
BILIRUBIN URINE: NEGATIVE
BLOOD URINE: NEGATIVE
BUN SERPL-MCNC: 20 MG/DL
CALCIUM SERPL-MCNC: 8.9 MG/DL
CHLORIDE SERPL-SCNC: 103 MMOL/L
CO2 SERPL-SCNC: 25 MMOL/L
COLOR: YELLOW
CREAT SERPL-MCNC: 0.92 MG/DL
EOSINOPHIL # BLD AUTO: 0.01 K/UL
EOSINOPHIL NFR BLD AUTO: 0.2 %
GLUCOSE QUALITATIVE U: NEGATIVE
GLUCOSE SERPL-MCNC: 127 MG/DL
HCT VFR BLD CALC: 52 %
HGB BLD-MCNC: 16.5 G/DL
HYALINE CASTS: 1 /LPF
IMM GRANULOCYTES NFR BLD AUTO: 0.7 %
INR PPP: 1.02 RATIO
KETONES URINE: NEGATIVE
LEUKOCYTE ESTERASE URINE: NEGATIVE
LYMPHOCYTES # BLD AUTO: 0.45 K/UL
LYMPHOCYTES NFR BLD AUTO: 10.3 %
MAN DIFF?: NORMAL
MCHC RBC-ENTMCNC: 27.2 PG
MCHC RBC-ENTMCNC: 31.7 GM/DL
MCV RBC AUTO: 85.7 FL
MICROSCOPIC-UA: NORMAL
MONOCYTES # BLD AUTO: 0.29 K/UL
MONOCYTES NFR BLD AUTO: 6.7 %
NEUTROPHILS # BLD AUTO: 3.56 K/UL
NEUTROPHILS NFR BLD AUTO: 81.6 %
NITRITE URINE: NEGATIVE
PH URINE: 6
PLATELET # BLD AUTO: 152 K/UL
POTASSIUM SERPL-SCNC: 3.8 MMOL/L
PROT SERPL-MCNC: 6.6 G/DL
PROTEIN URINE: NORMAL
PT BLD: 12.1 SEC
RBC # BLD: 6.07 M/UL
RBC # FLD: 14.9 %
RED BLOOD CELLS URINE: 1 /HPF
SODIUM SERPL-SCNC: 141 MMOL/L
SPECIFIC GRAVITY URINE: 1.02
SQUAMOUS EPITHELIAL CELLS: 1 /HPF
UROBILINOGEN URINE: ABNORMAL
WBC # FLD AUTO: 4.36 K/UL
WHITE BLOOD CELLS URINE: 1 /HPF

## 2021-10-15 PROCEDURE — 99204 OFFICE O/P NEW MOD 45 MIN: CPT

## 2021-10-15 NOTE — HISTORY OF PRESENT ILLNESS
[TextBox_4] : 67 yo M, never smoker with past medical history of asthma, HTN, HLD, overactive bladder s/p botox now pending stimulator placement referred to pulmonary clinic for pre-op optimization and to establish care for his asthma. \par Patient reports that he was diagnosed with asthma 3 years ago. One ER visit, about 1 week ago when he was having persistent chest tightness despite albuterol use. Went to [facility] ER, received nebs and 5 day course of oral prednisone. Since on prednisone has not needed to use his rescue inhaler. Prior to this episode has been using albuterol inhaler 2-3 times a week. Usally fall season is worse for his asthma. Never been hospitalised. \par ROS positive for hoarseness of voice, congestion and pehlgm in throat. \par Of note saw Dr. Campos ( ENT ) two weeks ago but no laryngoscopy was performed. \par

## 2021-10-15 NOTE — PHYSICAL EXAM
[No Acute Distress] : no acute distress [Supple] : supple [Normal Rate/Rhythm] : normal rate/rhythm [Normal S1, S2] : normal s1, s2 [Clear to Auscultation Bilaterally] : clear to auscultation bilaterally [No Resp Distress] : no resp distress [Gait - Sufficient For Exercise Testing] : gait sufficient for exercise testing [No Clubbing] : no clubbing [No Edema] : no edema [Oriented x3] : oriented x3 [Normal Affect] : normal affect

## 2021-10-15 NOTE — REVIEW OF SYSTEMS
[Postnasal Drip] : postnasal drip [Cough] : cough [Negative] : Endocrine [Fever] : no fever [Chills] : no chills [Chest Tightness] : no chest tightness [Dyspnea] : no dyspnea [SOB on Exertion] : no sob on exertion [Chest Discomfort] : no chest discomfort [Orthopnea] : no orthopnea [GERD] : no gerd [Abdominal Pain] : no abdominal pain [Diarrhea] : no diarrhea [Headache] : no headache

## 2021-10-15 NOTE — ASSESSMENT
[FreeTextEntry1] : Data reviewed: \par CXR done in 9-2021 at [facility]: clear lung fields\par \par Impression: \par Mild persistent asthma\par Pre-op optmization\par \par Plan: \par - start pulmicort 1 puff BID, flonasa nasal spray and continue albuterol PRN\par - given recent asthma exacerbation would delay elective procedure\par - he will see me in 2 weeks on the new regimen and will do PFT \par - recommend laryngoscopy when he sees Dr. Campos next\par \par RTC in 2 weeks.

## 2021-10-17 LAB — BACTERIA UR CULT: NORMAL

## 2021-10-25 ENCOUNTER — LABORATORY RESULT (OUTPATIENT)
Age: 66
End: 2021-10-25

## 2021-10-28 ENCOUNTER — APPOINTMENT (OUTPATIENT)
Dept: PULMONOLOGY | Facility: CLINIC | Age: 66
End: 2021-10-28
Payer: COMMERCIAL

## 2021-10-28 VITALS
SYSTOLIC BLOOD PRESSURE: 141 MMHG | OXYGEN SATURATION: 96 % | TEMPERATURE: 97.9 F | DIASTOLIC BLOOD PRESSURE: 82 MMHG | HEART RATE: 72 BPM | HEIGHT: 62 IN

## 2021-10-28 PROCEDURE — 94727 GAS DIL/WSHOT DETER LNG VOL: CPT

## 2021-10-28 PROCEDURE — 99214 OFFICE O/P EST MOD 30 MIN: CPT | Mod: 25

## 2021-10-28 PROCEDURE — 94729 DIFFUSING CAPACITY: CPT

## 2021-10-28 PROCEDURE — 94060 EVALUATION OF WHEEZING: CPT

## 2021-10-28 NOTE — ASSESSMENT
[FreeTextEntry1] : Data reviewed: \par CXR done in 9-2021 at [facility]: clear lung fields\par \par PFT 10-28-21: FVC 57%, FEV1 61%, 13% improvement in FVC with bronchodilator, FEv1/FVC 0.82.\par  TLC 68%, RV/%, DLCO 48%\par \par Impression/Plan\par 1.Mild persistent asthma\par No evidence of obstruction by FEv1/FVC ratio but elevated RV/tLC and signficant bronchodilator response on PFT\par - continue pulmicort BID\par - continue albuterol as needed\par \par 2. Moderate restriction on PFT with moderately reduced diffusion capacity. has crackles on exam. \par - will get HRCT with IV contrast to see if underlying ILD.\par \par 3. pre-op optimization for bladder stimulator. \par - ARISCAT score is  19 which puts her at an low risk for in hospital post op pulmonary complications (composite including respiratory failure, respiratory infections, pleural effusion, atelectasis, pneumothorax, bronchospasm and aspiration pneumonitis). He is optimized from a pulmonary standpoint for planned procedure. Would ensure early mobility, pain control, incentive spirometry and DVT ppx when deemed appropriate by the surgical team.\par \par RTC in 4 months

## 2021-10-28 NOTE — PHYSICAL EXAM
[No Acute Distress] : no acute distress [Supple] : supple [Normal Rate/Rhythm] : normal rate/rhythm [Normal S1, S2] : normal s1, s2 [No Resp Distress] : no resp distress [Gait - Sufficient For Exercise Testing] : gait sufficient for exercise testing [No Clubbing] : no clubbing [No Edema] : no edema [Oriented x3] : oriented x3 [Normal Affect] : normal affect [TextBox_68] : minimal crackles at the bases

## 2021-10-28 NOTE — HISTORY OF PRESENT ILLNESS
[TextBox_4] : 10-28-21:\par using pulmicort BID. has needed rescue inhaler only once. using flonase as needed. pending ECHO on 11-4-21\par \par 10-15-21\par 65 yo M, never smoker with past medical history of asthma, HTN, HLD, overactive bladder s/p botox now pending stimulator placement referred to pulmonary clinic for pre-op optimization and to establish care for his asthma. \par Patient reports that he was diagnosed with asthma 3 years ago. One ER visit, about 1 week ago when he was having persistent chest tightness despite albuterol use. Went to [facility] ER, received nebs and 5 day course of oral prednisone. Since on prednisone has not needed to use his rescue inhaler. Prior to this episode has been using albuterol inhaler 2-3 times a week. Usually fall season is worse for his asthma. Never been hospitalized. \par ROS positive for hoarseness of voice, congestion and phlegm in throat. \par Of note saw Dr. Campos ( ENT ) two weeks ago but no laryngoscopy was performed. 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 \par

## 2021-10-28 NOTE — REVIEW OF SYSTEMS
[Postnasal Drip] : postnasal drip [Negative] : Endocrine [Fever] : no fever [Chills] : no chills [Cough] : no cough [Chest Tightness] : no chest tightness [Dyspnea] : no dyspnea [SOB on Exertion] : no sob on exertion [Chest Discomfort] : no chest discomfort [Orthopnea] : no orthopnea [GERD] : no gerd [Abdominal Pain] : no abdominal pain [Diarrhea] : no diarrhea [Headache] : no headache

## 2021-11-19 ENCOUNTER — NON-APPOINTMENT (OUTPATIENT)
Age: 66
End: 2021-11-19

## 2021-11-19 LAB
BACTERIA UR CULT: NORMAL
SARS-COV-2 N GENE NPH QL NAA+PROBE: NOT DETECTED

## 2021-11-21 ENCOUNTER — TRANSCRIPTION ENCOUNTER (OUTPATIENT)
Age: 66
End: 2021-11-21

## 2021-11-22 ENCOUNTER — OUTPATIENT (OUTPATIENT)
Dept: OUTPATIENT SERVICES | Facility: HOSPITAL | Age: 66
LOS: 1 days | Discharge: HOME | End: 2021-11-22
Payer: COMMERCIAL

## 2021-11-22 ENCOUNTER — APPOINTMENT (OUTPATIENT)
Dept: UROLOGY | Facility: AMBULATORY SURGERY CENTER | Age: 66
End: 2021-11-22

## 2021-11-22 DIAGNOSIS — Z98.89 OTHER SPECIFIED POSTPROCEDURAL STATES: Chronic | ICD-10-CM

## 2021-11-22 PROCEDURE — 64581 OPN IMPLTJ NEA SACRAL NERVE: CPT

## 2021-11-22 PROCEDURE — 76000 FLUOROSCOPY <1 HR PHYS/QHP: CPT | Mod: 26

## 2021-11-22 RX ORDER — CEPHALEXIN 500 MG
1 CAPSULE ORAL
Qty: 6 | Refills: 0
Start: 2021-11-22 | End: 2021-11-24

## 2021-11-22 RX ORDER — OXYCODONE AND ACETAMINOPHEN 5; 325 MG/1; MG/1
1 TABLET ORAL
Qty: 10 | Refills: 0
Start: 2021-11-22

## 2021-11-30 ENCOUNTER — APPOINTMENT (OUTPATIENT)
Dept: UROLOGY | Facility: CLINIC | Age: 66
End: 2021-11-30
Payer: COMMERCIAL

## 2021-11-30 VITALS
HEART RATE: 79 BPM | SYSTOLIC BLOOD PRESSURE: 104 MMHG | TEMPERATURE: 98 F | DIASTOLIC BLOOD PRESSURE: 69 MMHG | OXYGEN SATURATION: 97 %

## 2021-11-30 PROCEDURE — 99213 OFFICE O/P EST LOW 20 MIN: CPT

## 2021-11-30 NOTE — HISTORY OF PRESENT ILLNESS
[FreeTextEntry1] : 66-year-old gentleman who is 1 week status post stage I InterStim.  He states he does not notice a difference in his voiding symptoms.  He also does state that he does not feel the stimulation and is unsure if he actually turned it on.  After using his handheld device it was noted that the stimulation was actually off.  I turned it on, increased the stimulation to 0.9 mA and he felt the stimulation in his bicycle seat scrotal area.  It was not painful.

## 2021-11-30 NOTE — ASSESSMENT
[FreeTextEntry1] : \par \par Impression/plan: 66-year-old gentleman who is 1 week status post stage I InterStim, unfortunately he had not turned on the device so has not gotten any stimulation over the last week.  I showed him how to use the device and increase the stimulation pending symptom change.  He will follow-up with me in a week for reevaluation, he is going to have a subsequent stage II procedure if this device works for him in 2 weeks time.

## 2021-11-30 NOTE — PHYSICAL EXAM
[General Appearance - Well Developed] : well developed [General Appearance - Well Nourished] : well nourished [Normal Appearance] : normal appearance [Well Groomed] : well groomed [General Appearance - In No Acute Distress] : no acute distress [FreeTextEntry1] : Dressings on his back are all intact, clean and dry. [Edema] : no peripheral edema [] : no respiratory distress [Respiration, Rhythm And Depth] : normal respiratory rhythm and effort [Exaggerated Use Of Accessory Muscles For Inspiration] : no accessory muscle use [Oriented To Time, Place, And Person] : oriented to person, place, and time [Normal Station and Gait] : the gait and station were normal for the patient's age

## 2021-12-06 ENCOUNTER — APPOINTMENT (OUTPATIENT)
Dept: UROLOGY | Facility: CLINIC | Age: 66
End: 2021-12-06
Payer: COMMERCIAL

## 2021-12-06 VITALS
HEART RATE: 72 BPM | OXYGEN SATURATION: 94 % | TEMPERATURE: 97.8 F | SYSTOLIC BLOOD PRESSURE: 123 MMHG | DIASTOLIC BLOOD PRESSURE: 78 MMHG

## 2021-12-06 PROCEDURE — 99024 POSTOP FOLLOW-UP VISIT: CPT

## 2021-12-06 NOTE — HISTORY OF PRESENT ILLNESS
[FreeTextEntry1] : 66-year-old gentleman who is 2 weeks status post stage I InterStim.  He states he has had an 8 out of 10 improvement in his OAB–wet, he is very happy with the outcome thus far.  He has no complaints.  He feels a stimulation in the scrotal area.\par

## 2021-12-06 NOTE — ASSESSMENT
[FreeTextEntry1] : \par \par Impression/plan: 66-year-old gentleman who is 2 weeks status post stage I InterStim.  He states he has had an 8 out of 10 improvement in his OAB–wet, he is very happy with the outcome thus far.  He would like to proceed with stage II portion of the InterStim placement which will be done this coming Monday.

## 2021-12-12 ENCOUNTER — TRANSCRIPTION ENCOUNTER (OUTPATIENT)
Age: 66
End: 2021-12-12

## 2021-12-13 ENCOUNTER — APPOINTMENT (OUTPATIENT)
Dept: UROLOGY | Facility: AMBULATORY SURGERY CENTER | Age: 66
End: 2021-12-13

## 2021-12-13 ENCOUNTER — OUTPATIENT (OUTPATIENT)
Dept: OUTPATIENT SERVICES | Facility: HOSPITAL | Age: 66
LOS: 1 days | Discharge: HOME | End: 2021-12-13
Payer: COMMERCIAL

## 2021-12-13 ENCOUNTER — NON-APPOINTMENT (OUTPATIENT)
Age: 66
End: 2021-12-13

## 2021-12-13 DIAGNOSIS — Z98.89 OTHER SPECIFIED POSTPROCEDURAL STATES: Chronic | ICD-10-CM

## 2021-12-13 LAB — SARS-COV-2 N GENE NPH QL NAA+PROBE: NOT DETECTED

## 2021-12-13 PROCEDURE — 64590 INS/RPL PRPH SAC/GSTR NPG/R: CPT | Mod: 58

## 2021-12-13 PROCEDURE — 95972 ALYS CPLX SP/PN NPGT W/PRGRM: CPT

## 2022-01-04 ENCOUNTER — APPOINTMENT (OUTPATIENT)
Dept: UROLOGY | Facility: CLINIC | Age: 67
End: 2022-01-04
Payer: COMMERCIAL

## 2022-01-04 VITALS
HEART RATE: 82 BPM | SYSTOLIC BLOOD PRESSURE: 112 MMHG | DIASTOLIC BLOOD PRESSURE: 73 MMHG | OXYGEN SATURATION: 98 % | TEMPERATURE: 97.52 F

## 2022-01-04 PROCEDURE — 99213 OFFICE O/P EST LOW 20 MIN: CPT | Mod: 25

## 2022-01-04 PROCEDURE — 95972 ALYS CPLX SP/PN NPGT W/PRGRM: CPT

## 2022-01-04 NOTE — ASSESSMENT
[FreeTextEntry1] : \par Impression/plan: 66 year-old gentleman s/p Interstim one month ago.\par \par Program 3 setting 0.4\par \par 1. F/u in 6 months or sooner if any problems arise with Interstim. \par

## 2022-01-04 NOTE — HISTORY OF PRESENT ILLNESS
[FreeTextEntry1] : 66 year-old  year gentleman s/p Interstim procedure presents for him one month post-op. Patient reports that he does not feel the stimulation. Patient was on program 1 at a setting of 1.2. Increased setting to 1.3 and patient reported the stimulation to be felt on the underside of his left buttock. Changed patient to program 3 at a setting of 0.4. Stimulation felt in the bicycle seat area. Surgical dressing removed. Surgical site of the right upper buttock is clean dry and intact.

## 2022-01-04 NOTE — PHYSICAL EXAM
[General Appearance - Well Developed] : well developed [General Appearance - Well Nourished] : well nourished [Normal Appearance] : normal appearance [Well Groomed] : well groomed [General Appearance - In No Acute Distress] : no acute distress [FreeTextEntry1] : right buttock surgical site C/D/I.  [Edema] : no peripheral edema [] : no respiratory distress [Respiration, Rhythm And Depth] : normal respiratory rhythm and effort [Exaggerated Use Of Accessory Muscles For Inspiration] : no accessory muscle use [Oriented To Time, Place, And Person] : oriented to person, place, and time [Affect] : the affect was normal [Mood] : the mood was normal [Not Anxious] : not anxious [Normal Station and Gait] : the gait and station were normal for the patient's age

## 2022-02-08 ENCOUNTER — APPOINTMENT (OUTPATIENT)
Dept: UROLOGY | Facility: CLINIC | Age: 67
End: 2022-02-08
Payer: COMMERCIAL

## 2022-02-08 VITALS
SYSTOLIC BLOOD PRESSURE: 132 MMHG | DIASTOLIC BLOOD PRESSURE: 84 MMHG | OXYGEN SATURATION: 97 % | HEART RATE: 71 BPM | TEMPERATURE: 98.5 F

## 2022-02-08 DIAGNOSIS — B36.9 SUPERFICIAL MYCOSIS, UNSPECIFIED: ICD-10-CM

## 2022-02-08 PROCEDURE — 99213 OFFICE O/P EST LOW 20 MIN: CPT | Mod: 24

## 2022-02-08 NOTE — HISTORY OF PRESENT ILLNESS
[FreeTextEntry1] : 67 yo gentleman with a few day c/o right groin pain. Happy with voiding pattern with SNS.

## 2022-02-08 NOTE — PHYSICAL EXAM
[General Appearance - Well Developed] : well developed [General Appearance - Well Nourished] : well nourished [Normal Appearance] : normal appearance [Well Groomed] : well groomed [General Appearance - In No Acute Distress] : no acute distress [FreeTextEntry1] : right groin/scrotal fungal infection with skin excoriation.  [] : no respiratory distress [Respiration, Rhythm And Depth] : normal respiratory rhythm and effort [Exaggerated Use Of Accessory Muscles For Inspiration] : no accessory muscle use [Oriented To Time, Place, And Person] : oriented to person, place, and time [Normal Station and Gait] : the gait and station were normal for the patient's age

## 2022-02-08 NOTE — ASSESSMENT
[FreeTextEntry1] : \par \par Impression/plan: 67 yo gentleman with a few day c/o right groin pain - fungal rash with skin excoriation. \par \par 1. Nystatin/triamcinolone cream twice daily for 2 weeks. \par 2. F/u in 2-3 weeks.

## 2022-02-24 ENCOUNTER — APPOINTMENT (OUTPATIENT)
Dept: PULMONOLOGY | Facility: CLINIC | Age: 67
End: 2022-02-24
Payer: COMMERCIAL

## 2022-02-24 VITALS
DIASTOLIC BLOOD PRESSURE: 78 MMHG | WEIGHT: 170 LBS | HEIGHT: 62 IN | HEART RATE: 75 BPM | SYSTOLIC BLOOD PRESSURE: 130 MMHG | OXYGEN SATURATION: 96 % | BODY MASS INDEX: 31.28 KG/M2 | TEMPERATURE: 97.8 F

## 2022-02-24 PROCEDURE — 99214 OFFICE O/P EST MOD 30 MIN: CPT

## 2022-02-24 NOTE — HISTORY OF PRESENT ILLNESS
[TextBox_4] : 2-24-22 asthma has been in great control. has not needed to use albuterol. did not grt ct chets done. sw ENT and had a laryngoscopy which was normal. \par \par 10-28-21:\par using pulmicort BID. has needed rescue inhaler only once. using flonase as needed. pending ECHO on 11-4-21\par \par 10-15-21\par 65 yo M, never smoker with past medical history of asthma, HTN, HLD, overactive bladder s/p botox now pending stimulator placement referred to pulmonary clinic for pre-op optimization and to establish care for his asthma. \par Patient reports that he was diagnosed with asthma 3 years ago. One ER visit, about 1 week ago when he was having persistent chest tightness despite albuterol use. Went to [facility] ER, received nebs and 5 day course of oral prednisone. Since on prednisone has not needed to use his rescue inhaler. Prior to this episode has been using albuterol inhaler 2-3 times a week. Usually fall season is worse for his asthma. Never been hospitalized. \par ROS positive for hoarseness of voice, congestion and phlegm in throat. \par Of note saw Dr. Campos ( ENT ) two weeks ago but no laryngoscopy was performed. 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 \par

## 2022-02-24 NOTE — ASSESSMENT
[FreeTextEntry1] : Data reviewed: \par CXR done in 9-2021 at [facility]: clear lung fields\par \par PFT 10-28-21: FVC 57%, FEV1 61%, 13% improvement in FVC with bronchodilator, FEv1/FVC 0.82.\par  TLC 68%, RV/%, DLCO 48%\par \par Impression/Plan\par 1.Mild persistent asthma\par No evidence of obstruction by FEv1/FVC ratio but elevated RV/tLC and signficant bronchodilator response on PFT\par - continue pulmicort BID\par - continue albuterol as needed\par \par 2. Moderate restriction on PFT with moderately reduced diffusion capacity. has crackles on exam. \par - will get HRCT with IV contrast to see if underlying ILD.\par \par \par RTC in 4 months, PFT prior to next visit

## 2022-03-01 ENCOUNTER — APPOINTMENT (OUTPATIENT)
Dept: UROLOGY | Facility: CLINIC | Age: 67
End: 2022-03-01

## 2022-03-01 ENCOUNTER — RESULT CHARGE (OUTPATIENT)
Age: 67
End: 2022-03-01

## 2022-03-02 ENCOUNTER — APPOINTMENT (OUTPATIENT)
Dept: UROLOGY | Facility: CLINIC | Age: 67
End: 2022-03-02
Payer: COMMERCIAL

## 2022-03-02 PROCEDURE — 95972 ALYS CPLX SP/PN NPGT W/PRGRM: CPT

## 2022-03-02 PROCEDURE — 99213 OFFICE O/P EST LOW 20 MIN: CPT | Mod: 25

## 2022-03-02 NOTE — HISTORY OF PRESENT ILLNESS
[FreeTextEntry1] : 67 yo gentleman with a right groin rash, treated with steroid/fungal cream with complete resolution of symptoms. Happy with voiding pattern (OAB) with SNS, though states he feels m,ore in his buttock on program 3 at setting of 0.4.\par \par Changed to program 1, setting of 1, feels it testicles/base of penis. \par \par

## 2022-03-02 NOTE — PHYSICAL EXAM
[General Appearance - Well Developed] : well developed [General Appearance - Well Nourished] : well nourished [Normal Appearance] : normal appearance [Well Groomed] : well groomed [General Appearance - In No Acute Distress] : no acute distress [Abdomen Soft] : soft [Abdomen Tenderness] : non-tender [Costovertebral Angle Tenderness] : no ~M costovertebral angle tenderness [FreeTextEntry1] : rash resolved.  [] : no respiratory distress [Respiration, Rhythm And Depth] : normal respiratory rhythm and effort [Exaggerated Use Of Accessory Muscles For Inspiration] : no accessory muscle use [Oriented To Time, Place, And Person] : oriented to person, place, and time [Normal Station and Gait] : the gait and station were normal for the patient's age

## 2022-03-02 NOTE — ASSESSMENT
[FreeTextEntry1] : \par \par Impression/plan: 65 yo gentleman with a right groin rash, treated with steroid/fungal cream with complete resolution of symptoms. OAB improved with SNS, though states he feels more in his buttock on program 3 at setting of 0.4. Changed to program 1, setting of 1, feels it testicles/base of penis. F/u in 3-6 months.

## 2022-03-15 ENCOUNTER — APPOINTMENT (OUTPATIENT)
Dept: CT IMAGING | Facility: HOSPITAL | Age: 67
End: 2022-03-15

## 2022-03-15 ENCOUNTER — OUTPATIENT (OUTPATIENT)
Dept: OUTPATIENT SERVICES | Facility: HOSPITAL | Age: 67
LOS: 1 days | End: 2022-03-15
Payer: COMMERCIAL

## 2022-03-15 DIAGNOSIS — Z98.89 OTHER SPECIFIED POSTPROCEDURAL STATES: Chronic | ICD-10-CM

## 2022-03-15 PROCEDURE — 71250 CT THORAX DX C-: CPT | Mod: 26

## 2022-03-15 PROCEDURE — 71250 CT THORAX DX C-: CPT

## 2022-03-29 ENCOUNTER — APPOINTMENT (OUTPATIENT)
Dept: UROLOGY | Facility: CLINIC | Age: 67
End: 2022-03-29
Payer: COMMERCIAL

## 2022-03-29 VITALS
HEART RATE: 82 BPM | DIASTOLIC BLOOD PRESSURE: 90 MMHG | SYSTOLIC BLOOD PRESSURE: 131 MMHG | OXYGEN SATURATION: 100 % | TEMPERATURE: 97.3 F

## 2022-03-29 PROCEDURE — 99213 OFFICE O/P EST LOW 20 MIN: CPT | Mod: 25

## 2022-03-29 PROCEDURE — 95972 ALYS CPLX SP/PN NPGT W/PRGRM: CPT

## 2022-03-29 NOTE — HISTORY OF PRESENT ILLNESS
[FreeTextEntry1] : 67 yo gentleman with OAB s/p SNS, feels like needs adjustment based on symptoms. \par \par Changed setting for program 1 to 1.5 feels it testicles/base of penis. \par

## 2022-03-29 NOTE — ASSESSMENT
[FreeTextEntry1] : \par \par Impression/plan: 67 yo gentleman with OAB s/p SNS, feels like needs adjustment based on symptoms. \par \par Changed setting for program 1 to 1.5 feels it testicles/base of penis. \par \par 1. F/u 2-3 months, encouraged to change settings at home himself, again reviewed use of .

## 2022-03-30 ENCOUNTER — NON-APPOINTMENT (OUTPATIENT)
Age: 67
End: 2022-03-30

## 2022-06-17 LAB — SARS-COV-2 N GENE NPH QL NAA+PROBE: NOT DETECTED

## 2022-06-23 ENCOUNTER — APPOINTMENT (OUTPATIENT)
Dept: PULMONOLOGY | Facility: CLINIC | Age: 67
End: 2022-06-23

## 2022-06-23 ENCOUNTER — APPOINTMENT (OUTPATIENT)
Dept: PULMONOLOGY | Facility: CLINIC | Age: 67
End: 2022-06-23
Payer: COMMERCIAL

## 2022-06-23 VITALS
SYSTOLIC BLOOD PRESSURE: 130 MMHG | OXYGEN SATURATION: 98 % | DIASTOLIC BLOOD PRESSURE: 77 MMHG | HEART RATE: 69 BPM | TEMPERATURE: 97 F | HEIGHT: 62 IN

## 2022-06-23 PROCEDURE — ZZZZZ: CPT

## 2022-06-23 PROCEDURE — 94060 EVALUATION OF WHEEZING: CPT

## 2022-06-23 PROCEDURE — 94729 DIFFUSING CAPACITY: CPT

## 2022-06-23 PROCEDURE — 94727 GAS DIL/WSHOT DETER LNG VOL: CPT

## 2022-06-23 PROCEDURE — 99214 OFFICE O/P EST MOD 30 MIN: CPT | Mod: 25

## 2022-06-23 NOTE — HISTORY OF PRESENT ILLNESS
[TextBox_4] :  6-23-22 feels better than before. able to walk 20-40 bloicks no dyspnea, only gets dyspneic when climing up 5 flihgts of stairs. rarely needs to use albuterol. using pulmicort BID. \par \par 2-24-22 asthma has been in great control. has not needed to use albuterol. did not grt ct chets done. sw ENT and had a laryngoscopy which was normal. \par \par 10-28-21:\par using pulmicort BID. has needed rescue inhaler only once. using flonase as needed. pending ECHO on 11-4-21\par \par 10-15-21\par 67 yo M, never smoker with past medical history of asthma, HTN, HLD, overactive bladder s/p botox now pending stimulator placement referred to pulmonary clinic for pre-op optimization and to establish care for his asthma. \par Patient reports that he was diagnosed with asthma 3 years ago. One ER visit, about 1 week ago when he was having persistent chest tightness despite albuterol use. Went to [facility] ER, received nebs and 5 day course of oral prednisone. Since on prednisone has not needed to use his rescue inhaler. Prior to this episode has been using albuterol inhaler 2-3 times a week. Usually fall season is worse for his asthma. Never been hospitalized. \par ROS positive for hoarseness of voice, congestion and phlegm in throat. \par Of note saw Dr. Campos ( ENT ) two weeks ago but no laryngoscopy was performed. 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 \par

## 2022-06-23 NOTE — ASSESSMENT
[FreeTextEntry1] : Data reviewed: \par CXR done in 9-2021 at [facility]: clear lung fields\par \par PFT 10-28-21: FVC 57%, FEV1 61%, 13% improvement in FVC with bronchodilator, FEv1/FVC 0.82.\par  TLC 68%, RV/%, DLCO 48%\par \par PFT 6-23-22 FVC 62%, FEv1 49%, FEv1/FVC 0.71. No signficant bronchodilator response. TLC 50%, DLCO 49% \par \par HRCT 3-15-22:\par Impression: 1. Mosaic perfusion pattern bilaterally on inspiratory images, without significant change in lung density on expiratory images. The lack of change in lung density on expiratory images could either be secondary to suboptimal patient effort versus diffuse air trapping, as may be seen with constrictive bronchiolitis.\par 2. Cluster of small lucent lesions in left upper lobe, favored to represent bronchiolectasis rather than honeycombing.\par 3. Mild cylindrical bronchiectasis bilaterally.\par 4.There are small lung nodules bilaterally, most of which appear benign.\par 5. Aneurysmal aortic root, measuring 4.8 cm.\par \par Impression/Plan\par 1.Mild persistent asthma\par No evidence of obstruction by FEv1/FVC ratio but elevated RV/tLC and signficant bronchodilator response on PFT\par - continue pulmicort BID\par - continue albuterol as needed\par \par 2. Moderate restriction on PFT with moderately reduced diffusion capacity. \par HRCT done in 3-2022 showed mosiac perfusion which did not signficantly change on expiratory images ? respiratory bronchiolitis although patient does not have obstruction onf pft. his restriction by TLC is worse comapred to prior.\par - given excellent exercise tolerance, will just monitor pft\par - echo prior to next visit \par

## 2022-07-12 ENCOUNTER — APPOINTMENT (OUTPATIENT)
Dept: UROLOGY | Facility: CLINIC | Age: 67
End: 2022-07-12

## 2022-07-12 VITALS
SYSTOLIC BLOOD PRESSURE: 120 MMHG | TEMPERATURE: 97.2 F | HEART RATE: 84 BPM | DIASTOLIC BLOOD PRESSURE: 73 MMHG | RESPIRATION RATE: 97 BRPM

## 2022-07-12 PROCEDURE — 99214 OFFICE O/P EST MOD 30 MIN: CPT | Mod: 25

## 2022-07-12 PROCEDURE — 95972 ALYS CPLX SP/PN NPGT W/PRGRM: CPT

## 2022-07-12 NOTE — ASSESSMENT
[FreeTextEntry1] : \par \par Impression/plan: 66 years old male patient with OAB s/p  SNS, doing well no new symptoms. He is here today to get help to check his device. Device interrogated, on program 1 at setting of 1.5 mA. \par \par 1. F/u in 1 year for SNS check m sooner if necessary. Patient instruction again given to change setting at home if necessary.

## 2022-07-12 NOTE — PHYSICAL EXAM
[General Appearance - Well Developed] : well developed [General Appearance - Well Nourished] : well nourished [Normal Appearance] : normal appearance [Well Groomed] : well groomed [General Appearance - In No Acute Distress] : no acute distress [Abdomen Soft] : soft [Abdomen Tenderness] : non-tender [Costovertebral Angle Tenderness] : no ~M costovertebral angle tenderness [FreeTextEntry1] : IPG in good position.  [] : no respiratory distress [Respiration, Rhythm And Depth] : normal respiratory rhythm and effort [Exaggerated Use Of Accessory Muscles For Inspiration] : no accessory muscle use [Oriented To Time, Place, And Person] : oriented to person, place, and time [Normal Station and Gait] : the gait and station were normal for the patient's age

## 2022-07-12 NOTE — HISTORY OF PRESENT ILLNESS
[FreeTextEntry1] : 66 years old male patient with OAB s/p SNS, doing well no new symptoms. He is here today to get help to check his device. Device interrogated, on program 1 at setting of 1.5 mA.

## 2022-08-04 ENCOUNTER — OUTPATIENT (OUTPATIENT)
Dept: OUTPATIENT SERVICES | Facility: HOSPITAL | Age: 67
LOS: 1 days | End: 2022-08-04

## 2022-08-04 DIAGNOSIS — Z98.89 OTHER SPECIFIED POSTPROCEDURAL STATES: Chronic | ICD-10-CM

## 2022-08-04 DIAGNOSIS — R94.2 ABNORMAL RESULTS OF PULMONARY FUNCTION STUDIES: ICD-10-CM

## 2022-08-10 ENCOUNTER — NON-APPOINTMENT (OUTPATIENT)
Age: 67
End: 2022-08-10

## 2022-08-16 ENCOUNTER — APPOINTMENT (OUTPATIENT)
Dept: UROLOGY | Facility: CLINIC | Age: 67
End: 2022-08-16

## 2022-08-16 VITALS
OXYGEN SATURATION: 98 % | SYSTOLIC BLOOD PRESSURE: 128 MMHG | HEART RATE: 81 BPM | TEMPERATURE: 98.6 F | DIASTOLIC BLOOD PRESSURE: 76 MMHG

## 2022-08-16 PROCEDURE — 99214 OFFICE O/P EST MOD 30 MIN: CPT

## 2022-08-16 NOTE — HISTORY OF PRESENT ILLNESS
[FreeTextEntry1] : 66 years old male patient with OAB s/p SNS, doing well no new symptoms with neuromodulation. Last week had a couple of episodes of gross hematuria, seen at an ER, cannot recall which, and told to f/u with his urologist. Denies abd/flank pain. Denies dysuria. Denies f/c/n/v.

## 2022-08-16 NOTE — ASSESSMENT
[FreeTextEntry1] : \par \par Impression/plan: 66 years old male patient with OAB s/p SNS, doing well no new symptoms, new episode of gross hematuria.\par \par 1. Cont. Interstim at current settings.\par 2. Urine c/s, urine cytology, CT Urogram and cystoscopy.

## 2022-08-19 LAB
BACTERIA UR CULT: NORMAL
URINE CYTOLOGY: NORMAL

## 2022-08-23 ENCOUNTER — OUTPATIENT (OUTPATIENT)
Dept: OUTPATIENT SERVICES | Facility: HOSPITAL | Age: 67
LOS: 1 days | End: 2022-08-23
Payer: COMMERCIAL

## 2022-08-23 ENCOUNTER — APPOINTMENT (OUTPATIENT)
Dept: CT IMAGING | Facility: HOSPITAL | Age: 67
End: 2022-08-23

## 2022-08-23 DIAGNOSIS — Z98.89 OTHER SPECIFIED POSTPROCEDURAL STATES: Chronic | ICD-10-CM

## 2022-08-23 LAB — POCT ISTAT CREATININE: 0.9 MG/DL — SIGNIFICANT CHANGE UP (ref 0.5–1.3)

## 2022-08-23 PROCEDURE — 74178 CT ABD&PLV WO CNTR FLWD CNTR: CPT | Mod: 26

## 2022-08-23 PROCEDURE — 74178 CT ABD&PLV WO CNTR FLWD CNTR: CPT

## 2022-08-23 PROCEDURE — 82565 ASSAY OF CREATININE: CPT

## 2022-08-30 ENCOUNTER — APPOINTMENT (OUTPATIENT)
Dept: UROLOGY | Facility: CLINIC | Age: 67
End: 2022-08-30

## 2022-08-30 VITALS
RESPIRATION RATE: 16 BRPM | OXYGEN SATURATION: 97 % | HEART RATE: 67 BPM | DIASTOLIC BLOOD PRESSURE: 72 MMHG | SYSTOLIC BLOOD PRESSURE: 120 MMHG | TEMPERATURE: 97.3 F

## 2022-08-30 DIAGNOSIS — R31.0 GROSS HEMATURIA: ICD-10-CM

## 2022-08-30 PROCEDURE — 52000 CYSTOURETHROSCOPY: CPT

## 2022-08-30 PROCEDURE — 81003 URINALYSIS AUTO W/O SCOPE: CPT | Mod: QW

## 2022-09-02 ENCOUNTER — NON-APPOINTMENT (OUTPATIENT)
Age: 67
End: 2022-09-02

## 2022-09-02 LAB
BILIRUB UR QL STRIP: NORMAL
CLARITY UR: CLEAR
COLLECTION METHOD: NORMAL
GLUCOSE UR-MCNC: NORMAL
HCG UR QL: 1 EU/DL
HGB UR QL STRIP.AUTO: NORMAL
KETONES UR-MCNC: NORMAL
LEUKOCYTE ESTERASE UR QL STRIP: NORMAL
NITRITE UR QL STRIP: NORMAL
PH UR STRIP: 5.5
PROT UR STRIP-MCNC: NORMAL
SP GR UR STRIP: 1.03

## 2022-09-09 ENCOUNTER — NON-APPOINTMENT (OUTPATIENT)
Age: 67
End: 2022-09-09

## 2022-10-26 ENCOUNTER — RESULT REVIEW (OUTPATIENT)
Age: 67
End: 2022-10-26

## 2022-12-06 ENCOUNTER — APPOINTMENT (OUTPATIENT)
Dept: UROLOGY | Facility: CLINIC | Age: 67
End: 2022-12-06

## 2022-12-06 VITALS
SYSTOLIC BLOOD PRESSURE: 115 MMHG | DIASTOLIC BLOOD PRESSURE: 70 MMHG | HEART RATE: 79 BPM | OXYGEN SATURATION: 96 % | TEMPERATURE: 96.9 F

## 2022-12-06 PROCEDURE — 95972 ALYS CPLX SP/PN NPGT W/PRGRM: CPT

## 2022-12-06 PROCEDURE — 99213 OFFICE O/P EST LOW 20 MIN: CPT | Mod: 25

## 2022-12-12 ENCOUNTER — APPOINTMENT (OUTPATIENT)
Dept: SURGICAL ONCOLOGY | Facility: CLINIC | Age: 67
End: 2022-12-12

## 2022-12-12 VITALS
WEIGHT: 175 LBS | HEART RATE: 60 BPM | SYSTOLIC BLOOD PRESSURE: 124 MMHG | OXYGEN SATURATION: 96 % | HEIGHT: 62 IN | BODY MASS INDEX: 32.2 KG/M2 | DIASTOLIC BLOOD PRESSURE: 73 MMHG | TEMPERATURE: 97.8 F

## 2022-12-12 DIAGNOSIS — R19.00 INTRA-ABDOMINAL AND PELVIC SWELLING, MASS AND LUMP, UNSPECIFIED SITE: ICD-10-CM

## 2022-12-12 PROCEDURE — 99204 OFFICE O/P NEW MOD 45 MIN: CPT

## 2022-12-12 RX ORDER — PREDNISONE 20 MG/1
20 TABLET ORAL DAILY
Qty: 4 | Refills: 0 | Status: DISCONTINUED | COMMUNITY
Start: 2021-10-14 | End: 2022-12-12

## 2022-12-12 RX ORDER — ATORVASTATIN CALCIUM 10 MG/1
10 TABLET, FILM COATED ORAL
Qty: 90 | Refills: 0 | Status: DISCONTINUED | COMMUNITY
Start: 2021-10-14 | End: 2022-12-12

## 2022-12-12 RX ORDER — CEPHALEXIN 500 MG/1
500 TABLET ORAL
Qty: 6 | Refills: 0 | Status: DISCONTINUED | COMMUNITY
Start: 2021-12-13 | End: 2022-12-12

## 2022-12-12 RX ORDER — NYSTATIN AND TRIAMCINOLONE ACETONIDE 100000; 1 MG/G; MG/G
100000-0.1 CREAM TOPICAL TWICE DAILY
Qty: 1 | Refills: 0 | Status: DISCONTINUED | COMMUNITY
Start: 2022-02-08 | End: 2022-12-12

## 2022-12-12 RX ORDER — FLUTICASONE PROPIONATE 50 UG/1
50 SPRAY, METERED NASAL TWICE DAILY
Qty: 1 | Refills: 3 | Status: DISCONTINUED | COMMUNITY
Start: 2021-10-15 | End: 2022-12-12

## 2022-12-12 NOTE — PHYSICAL EXAM
[General Appearance - Well Developed] : well developed [General Appearance - Well Nourished] : well nourished [Normal Appearance] : normal appearance [Well Groomed] : well groomed [General Appearance - In No Acute Distress] : no acute distress [Respiration, Rhythm And Depth] : normal respiratory rhythm and effort [] : no respiratory distress [Exaggerated Use Of Accessory Muscles For Inspiration] : no accessory muscle use [Oriented To Time, Place, And Person] : oriented to person, place, and time [Normal Station and Gait] : the gait and station were normal for the patient's age

## 2022-12-12 NOTE — RESULTS/DATA
[FreeTextEntry1] : Patient:   Herberth Anne\par \par \par Accession:                             [id number]\par \par Collected Date/Time:                   10/26/2022 17:41 EDT\par Received Date/Time:                    10/26/2022 17:42 EDT\par \par Addendum Report - Auth (Verified)\par \par Addendum\par On block 7A, immunostain for H. pylori is positive.\par \par Verified by: Yary Borden MD\par (Electronic Signature)\par Reported on: 11/02/22 10:36 EDT, [facility], [address] [place] [address],\[place] [address]\par Phone: [phone]   Fax: [phone]\par _________________________________________________________________\par \par Disclaimer\par _\par These immunohistochemical tests have been developed and their performance\par characteristics determined by [facility], Department of\par Pathology, Division of Immunopathology, [address] [place] [address].   It has not been cleared or approved by the U.S. Food and\par Drug Administration.    The FDA has determined that such clearance or\par approval is not necessary.    This test is used for clinical purposes.   \par The laboratory certified under the CLIA-88 as qualified to perform high\par complexity clinical testing.\par \par Surgical Pathology Report - Auth (Verified)\par \par Specimen(s) Submitted\par 1  Duodenum, biopsy\par 2  Second part duodenum, biopsy\par 3  Duodenum bulb biopsy\par 4  Antrum biopsy\par 5  Antrum nodule biopsy\par 6  Body  biopsy\par 7  Cardia nodule  biopsy\par 8  Ileum  biopsy\par 9  Terminal ileum  biopsy\par 10  Cecum polyp  biopsy\par 11  Ascending colon polyp  biopsy\par 12  Descending colon polyp  biopsy\par \par Final Diagnosis\par 1. Duodenum, biopsy:\par - Duodenal mucosa with preserved villous architecture.\par - Negative for increased intraepithelial lymphocytes and\par microorganisms.\par \par 2. Second part duodenum, biopsy:\par - Duodenal mucosa with preserved villous architecture.\par - Negative for increased intraepithelial lymphocytes and\par microorganisms.\par \par 3. Duodenal bulb, biopsy:\par - Duodenal mucosa with preserved villous architecture.\par - Negative for increased intraepithelial lymphocytes and\par microorganisms.\par \par 4. Antrum, biopsy:\par - Active chronic gastritis ith H. pylori.\par - Intestinal metaplasia, negative for dysplasia.\par \par Note: Immunostain for H. pylori is positive.\par \par \par 5. Antrum, nodule, biopsy:\par - Active chronic gastritis with H. pylori.\par - Intestinal metaplasia, negative for dysplasia.\par \par Note: Immunostain for H. pylori is positive.\par \par 6. Body, biopsy:\par - Active chronic gastritis with H. pylori.\par - Negative for intestinal metaplasia and H. pylori.\par \par 7. Cardia, nodule, biopsy:\par - Active chronic gastritis.\par - Intestinal metaplasia, negative for dysplasia.\par \par Note: Immunostain for H. pylori pending.\par \par 8. Ileum, biopsy:\par - Ileal mucosa with no specific histologic abnormality.\par \par 9. Terminal ileum, biopsy:\par - Ileal mucosa with no specific histologic abnormality.\par \par 10. Cecal polyp, biopsy:\par - Tubular adenoma.\par \par 11. Ascending colon polyp, biopsy:\par - Colonic mucosa with no specific histology abnormality.\par \par 12. Descending colon polyp, biopsy:\par - Tubular adenoma.\par \par Verified by: Yary Borden MD\par (Electronic Signature)\par Reported on: 11/01/22 14:46 EDT, [facility], [address],\[place] [address]\par Phone: [phone]   Fax: [phone]\par _________________________________________________________________\par \par Clinical Information\par K68.9; disorder of retroperitoneum\par \par Gross Description\par 1. Received in formalin labeled   "duodenum"  is a 0.3 cm in greatest\par dimension tan-pink, soft tissue fragment. Entirely in one cassette: 1A.\par \par 2. Received in formalin labeled   "second part duodenum"  are four, tan-\par pink, soft tissue fragments ranging from 0.1 cm to 0.3 cm in greatest\par dimension. Entirely in one cassette: 2A.\par \par \par 3. Received in formalin labeled   "duodenal bulb"  are two, tan-pink, soft\par tissue fragments measuring 0.1 cm and 0.2 cm in greatest dimension.\par Entirely in one cassette: 3A.\par \par 4. Received in formalin labeled   "antrum, rule out H. pylori"  are four,\par tan-pink, soft tissue fragments ranging from 0.3 cm to 0.5 cm in greatest\par dimension. Entirely in one cassette: 4A.\par \par 5. Received in formalin labeled   "antrum nodules"  are two, tan-pink, soft\par tissue fragments measuring 0.1 cm and 0.3 cm in greatest dimension.\par Entirely in one cassette: 5A.\par \par 6. Received in formalin labeled   "body, rule out H. pylori"  are two,\par tan-pink, soft tissue fragments measuring 0.1 cm and 0.2 cm in greatest\par dimension. Entirely in one cassette: 6A.\par \par 7. Received in formalin labeled   "cardia nodule"  are three, tan-pink, soft\par tissue fragments ranging from 0.1 cm to 0.3 cm in greatest dimension.\par Entirely in one cassette: 7A.\par \par 8. Received in formalin labeled   "ileum" are four, tan-pink, soft tissue\par fragments ranging from 0.1 cm to 0.2 cm in greatest dimension. Entirely\par in one cassette: 8A.\par \par 9. Received in formalin labeled   "terminal ileum"  is a To 0.3 cm in\par greatest dimension tan-pink, soft tissue fragment. Entirely in one\par cassette: 9A.\par \par 10. The specimen is received in formalin and the specimen container is\par labeled  "cecum polyp" . It consists of one tan-red polyp measuring 0.6 cm.\par Specimen is bisected. Entirely in one cassette: 10A.\par \par 11. Received in formalin labeled   "ascending colon polyp"  is a 0.4 cm\par in greatest dimension tan-pink, soft tissue fragment. Entirely in one\par cassette: 11A.\par \par 12. Received in formalin labeled   "descending colon polyp"  is a 0.3 cm\par in greatest dimension tan-pink, soft tissue fragment. Entirely in one\par cassette: 12A.\par \par Sandhya Barton 10/27/2022 01:55 PM\par \par \par Disclaimer\par In addition to other data that may appear on the specimen containers, all\par labels have been inspected to confirm the presence of the patient's name\par and date of birth.\par \par Histological Processing Performed at [facility], Department of\par Pathology, [address] [place], [place].

## 2022-12-12 NOTE — HISTORY OF PRESENT ILLNESS
[de-identified] : Patient Name: AKBAR KEARNEY \par MRN: [id number] \par [facility] MRN:\par Referring Provider: DAYANARA VARELA,DAPHNEY CEJA \par Oncologist: Dr. Lanier\par Date: 12/12/22\par \par Diagnosis: Retroperitoneal mass\par \par 67 year male  presents for evaluation of a retroperitoneal mass\par He has a history of overactive bladder and had hematuria. \par 8/23/22 - CT AP Urogram for hematuria incidentally found a retroperitoneal mass encasing and medially deviating but not obstructing the ureters and suspicious for retroperitoneal fibrosis. Also found to have thickened and inflamed loop of distal small bowel. \par He was referred here and to Dr. Guardado for findings.\par 9/16/22 - He saw Dr. Guardado and was planned to have a colonoscopy and EGD.\par 11/2/22 - EGD/Colonoscopy done, +H Pylori in the antrum, gastritis, and negative for any malignancies.\par \par Currently, Mr. KEARNEY denies abdominal pain and discomfort, denies having decreased appetite, and denies nausea or vomiting. He denies changes in bowel habits and denies recent unintentional weight loss. He denies fevers, chills, or night sweats.\par \par Functional Status: Mr. KEARNEY is able to walk up 2 flights of stairs without fatigue or dyspnea.\par \par

## 2022-12-12 NOTE — ASSESSMENT
[FreeTextEntry1] : \par \par Impression/plan:  66 years old male patient with OAB s/p SNS, on program 2 at setting of 1.6 mA. \par \par 1. F/u in 1 year for SNS check sooner if necessary. Patient instruction again given to change setting at home if necessary. \par

## 2022-12-12 NOTE — ASSESSMENT
[FreeTextEntry1] : The patient is a 67 y.o. man who initially presented with hematuria. \par A CT urogram (08/23/22) revealed a retroperitoneal mass encasing and medially deviating but not obstructing the ureters and suspicious for retroperitoneal fibrosis. There was also a thickened and inflamed loop of distal small bowel. \par On 10/26/22 Dr. Jean Bonoe performed an EGD (HP+ gastritis and intestinal metaplasia) and a colonoscopy. I do not have yet the report of the colonoscopy, but biopsies of the ileum and terminal ileum revealed normal mucosa.\par \par On my review of the CT, I did not appreciate any lymphadenopathy, and the retroperitoneal findings seem quite consistent with retroperitoneal fibrosis. However, lymphoma should be ruled out, and a biopsy is necessary.\par I believe the safest way to obtain tissue would be through upper endoscopic ultrasound, as the area of fibrosis extends quite proximally, and involves the SMA. We will refer the patient to Dr. Hakeem Cruz.\par Should an endoscopic bx be not diagnostic, I would then proceed with an IR biopsy. Laparoscopy would be a last resort.\par \par We are also ordering tumor markers (LDH, AFP, CEA, CA 19-9) and a PET to r/o hypermetabolic activity in other lymph node stations. However, in absence of a tissue diagnosis of malignancy, the PET may not be approved by the insurance. \par \par With regards to the thickened terminal ileum, the negative biopsies are certainly reassuring. I am not sure whether any anomaly was detected during the colonoscopy by Dr. Boone, but unless the additional work-up is negative for malignancy, I believe it would be reasonable to repeat a CT A/P in 3 months, to monitor any progression of the thickened ileum. \par \par I have emailed Dr. Lanier (referring physician), as well as Dr. Cruz and Dr. Boone with the plan outlined above here.

## 2022-12-12 NOTE — PHYSICAL EXAM
[Normal Neck Lymph Nodes] : normal neck lymph nodes  [Normal Supraclavicular Lymph Nodes] : normal supraclavicular lymph nodes [Normal] : oriented to person, place and time, with appropriate affect [de-identified] : anicteric [de-identified] : S1,S2, regular rate and rhythm. No murmurs heard. [de-identified] : Clear throughout. No wheezes heard. [de-identified] : warm and dry

## 2022-12-12 NOTE — HISTORY OF PRESENT ILLNESS
[FreeTextEntry1] : 67 years old male patient with OAB s/p SNS, doing well but feels like room for improvement with neuromodulation. He is on program 1 at setting 1.5. Program switched to 2, setting of 1.6, feels in testicles, comfortable.

## 2022-12-13 LAB
AFP-TM SERPL-MCNC: 3.7 NG/ML
ANION GAP SERPL CALC-SCNC: 16 MMOL/L
BUN SERPL-MCNC: 14 MG/DL
CALCIUM SERPL-MCNC: 9.7 MG/DL
CANCER AG19-9 SERPL-ACNC: <2 U/ML
CEA SERPL-MCNC: 2.1 NG/ML
CHLORIDE SERPL-SCNC: 101 MMOL/L
CO2 SERPL-SCNC: 26 MMOL/L
CREAT SERPL-MCNC: 0.99 MG/DL
EGFR: 85 ML/MIN/1.73M2
GLUCOSE SERPL-MCNC: 111 MG/DL
LDH SERPL-CCNC: 218 U/L
POTASSIUM SERPL-SCNC: 3.7 MMOL/L
SODIUM SERPL-SCNC: 143 MMOL/L

## 2022-12-19 ENCOUNTER — RESULT REVIEW (OUTPATIENT)
Age: 67
End: 2022-12-19

## 2022-12-19 ENCOUNTER — OUTPATIENT (OUTPATIENT)
Dept: OUTPATIENT SERVICES | Facility: HOSPITAL | Age: 67
LOS: 1 days | End: 2022-12-19
Payer: COMMERCIAL

## 2022-12-19 ENCOUNTER — APPOINTMENT (OUTPATIENT)
Dept: NUCLEAR MEDICINE | Facility: HOSPITAL | Age: 67
End: 2022-12-19

## 2022-12-19 DIAGNOSIS — Z98.89 OTHER SPECIFIED POSTPROCEDURAL STATES: Chronic | ICD-10-CM

## 2022-12-19 PROCEDURE — A9552: CPT

## 2022-12-19 PROCEDURE — 82962 GLUCOSE BLOOD TEST: CPT

## 2022-12-19 PROCEDURE — 78815 PET IMAGE W/CT SKULL-THIGH: CPT | Mod: 26,PI

## 2022-12-19 PROCEDURE — 78815 PET IMAGE W/CT SKULL-THIGH: CPT

## 2022-12-21 ENCOUNTER — APPOINTMENT (OUTPATIENT)
Dept: PULMONOLOGY | Facility: CLINIC | Age: 67
End: 2022-12-21
Payer: COMMERCIAL

## 2022-12-21 ENCOUNTER — APPOINTMENT (OUTPATIENT)
Dept: PULMONOLOGY | Facility: CLINIC | Age: 67
End: 2022-12-21

## 2022-12-21 VITALS
WEIGHT: 175 LBS | OXYGEN SATURATION: 95 % | SYSTOLIC BLOOD PRESSURE: 120 MMHG | DIASTOLIC BLOOD PRESSURE: 90 MMHG | HEART RATE: 69 BPM | HEIGHT: 62 IN | BODY MASS INDEX: 32.2 KG/M2

## 2022-12-21 DIAGNOSIS — R94.2 ABNORMAL RESULTS OF PULMONARY FUNCTION STUDIES: ICD-10-CM

## 2022-12-21 PROCEDURE — 94727 GAS DIL/WSHOT DETER LNG VOL: CPT

## 2022-12-21 PROCEDURE — 94729 DIFFUSING CAPACITY: CPT

## 2022-12-21 PROCEDURE — ZZZZZ: CPT

## 2022-12-21 PROCEDURE — 94060 EVALUATION OF WHEEZING: CPT

## 2022-12-21 PROCEDURE — 99214 OFFICE O/P EST MOD 30 MIN: CPT | Mod: 25

## 2022-12-21 NOTE — HISTORY OF PRESENT ILLNESS
[TextBox_4] : 12-21-22 here for f/u. being worked up for retroperitoneal mass. no pulmonary complains. job has become less active. he still says he has no limitations with walking. not able to do a lot of steps but feels that it is mostly due to knee issues. has not needed rescue inhaler at all. did not get ECHO done. \par \par 6-23-22 feels better than before. able to walk 20-40 bloicks no dyspnea, only gets dyspneic when climing up 5 flihgts of stairs. rarely needs to use albuterol. using pulmicort BID. \par \par 10-15-21\par 65 yo M, never smoker with past medical history of asthma, HTN, HLD, overactive bladder s/p botox now pending stimulator placement referred to pulmonary clinic for pre-op optimization and to establish care for his asthma. \par Patient reports that he was diagnosed with asthma 3 years ago. One ER visit, about 1 week ago when he was having persistent chest tightness despite albuterol use. Went to [facility] ER, received nebs and 5 day course of oral prednisone. Since on prednisone has not needed to use his rescue inhaler. Prior to this episode has been using albuterol inhaler 2-3 times a week. Usually fall season is worse for his asthma. Never been hospitalized. \par ROS positive for hoarseness of voice, congestion and phlegm in throat. \par Of note saw Dr. Campos ( ENT ) two weeks ago but no laryngoscopy was performed. 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 \par

## 2022-12-21 NOTE — ASSESSMENT
[FreeTextEntry1] : Data reviewed: \par CXR done in 9-2021 at [facility]: clear lung fields\par \par PFT 10-28-21: FVC 57%, FEV1 61%, 13% improvement in FVC with bronchodilator, FEv1/FVC 0.82.\par  TLC 68%, RV/%, DLCO 48%\par \par PFT 6-23-22 FVC 62%, FEv1 49%, FEv1/FVC 0.71. No signficant bronchodilator response. TLC 50%, DLCO 49% \par \par PFT 12-21-22 FVC 72%, FEV1 67%, FEv1/FVC 0.69. no signficant brocnhodlatot response. TLC 56%, DLCO 49% \par \par HRCT 3-15-22:\par Impression: 1. Mosaic perfusion pattern bilaterally on inspiratory images, without significant change in lung density on expiratory images. The lack of change in lung density on expiratory images could either be secondary to suboptimal patient effort versus diffuse air trapping, as may be seen with constrictive bronchiolitis.\par 2. Cluster of small lucent lesions in left upper lobe, favored to represent bronchiolectasis rather than honeycombing.\par 3. Mild cylindrical bronchiectasis bilaterally.\par 4.There are small lung nodules bilaterally, most of which appear benign.\par 5. Aneurysmal aortic root, measuring 4.8 cm.\par \par Impression/Plan\par 1.Mild persistent asthma\par No evidence of obstruction by FEv1/FVC ratio but elevated RV/tLC and signficant bronchodilator response on PFT in the past. pft today with moderate obstruction, no BD response. \par - continue pulmicort BID\par - continue albuterol as needed\par \par 2. Moderate restriction on PFT with moderately reduced diffusion capacity. \par HRCT done in 3-2022 showed mosiac perfusion which did not signficantly change on expiratory images ? respiratory bronchiolitis although patient does not have obstruction on PFT and has good exercise tolerance ? patient effort related \par - given excellent exercise tolerance, will just monitor pft which has been stable\par - ECHO reordered \par \par discussed flu and pneumococal vaccine, which he refused \par RTC in 4 months \par

## 2022-12-21 NOTE — PHYSICAL EXAM
[No Acute Distress] : no acute distress [Supple] : supple [Normal Rate/Rhythm] : normal rate/rhythm [Normal S1, S2] : normal s1, s2 [No Resp Distress] : no resp distress [Gait - Sufficient For Exercise Testing] : gait sufficient for exercise testing [No Clubbing] : no clubbing [No Edema] : no edema [Oriented x3] : oriented x3 [Normal Affect] : normal affect [Clear to Auscultation Bilaterally] : clear to auscultation bilaterally

## 2022-12-21 NOTE — CONSULT LETTER
[Dear  ___] : Dear  [unfilled], [Courtesy Letter:] : I had the pleasure of seeing your patient, [unfilled], in my office today. [Please see my note below.] : Please see my note below. [Consult Closing:] : Thank you very much for allowing me to participate in the care of this patient.  If you have any questions, please do not hesitate to contact me. [Sincerely,] : Sincerely, [DrMargaret  ___] : Dr. JARVIS [FreeTextEntry2] : Cherrie Felix [FreeTextEntry3] : Dr. Najera

## 2022-12-29 DIAGNOSIS — R59.1 GENERALIZED ENLARGED LYMPH NODES: ICD-10-CM

## 2023-01-03 LAB
APTT BLD: 38.5 SEC
INR PPP: 1.02 RATIO
PT BLD: 12 SEC

## 2023-01-04 ENCOUNTER — APPOINTMENT (OUTPATIENT)
Age: 68
End: 2023-01-04

## 2023-01-05 LAB — SARS-COV-2 N GENE NPH QL NAA+PROBE: NOT DETECTED

## 2023-01-09 ENCOUNTER — APPOINTMENT (OUTPATIENT)
Dept: INTERVENTIONAL RADIOLOGY/VASCULAR | Facility: HOSPITAL | Age: 68
End: 2023-01-09

## 2023-01-09 ENCOUNTER — RESULT REVIEW (OUTPATIENT)
Age: 68
End: 2023-01-09

## 2023-01-09 ENCOUNTER — OUTPATIENT (OUTPATIENT)
Dept: OUTPATIENT SERVICES | Facility: HOSPITAL | Age: 68
LOS: 1 days | End: 2023-01-09
Payer: COMMERCIAL

## 2023-01-09 DIAGNOSIS — Z98.89 OTHER SPECIFIED POSTPROCEDURAL STATES: Chronic | ICD-10-CM

## 2023-01-09 PROCEDURE — 88189 FLOWCYTOMETRY/READ 16 & >: CPT

## 2023-01-09 PROCEDURE — 38505 NEEDLE BIOPSY LYMPH NODES: CPT

## 2023-01-09 PROCEDURE — 88173 CYTOPATH EVAL FNA REPORT: CPT

## 2023-01-09 PROCEDURE — 88173 CYTOPATH EVAL FNA REPORT: CPT | Mod: 26

## 2023-01-09 PROCEDURE — 76942 ECHO GUIDE FOR BIOPSY: CPT | Mod: 26

## 2023-01-09 PROCEDURE — 76942 ECHO GUIDE FOR BIOPSY: CPT

## 2023-01-09 PROCEDURE — 88305 TISSUE EXAM BY PATHOLOGIST: CPT | Mod: 26

## 2023-01-09 PROCEDURE — 88305 TISSUE EXAM BY PATHOLOGIST: CPT

## 2023-01-11 LAB
NON-GYNECOLOGICAL CYTOLOGY STUDY: SIGNIFICANT CHANGE UP
SURGICAL PATHOLOGY STUDY: SIGNIFICANT CHANGE UP

## 2023-03-07 ENCOUNTER — APPOINTMENT (OUTPATIENT)
Dept: UROLOGY | Facility: CLINIC | Age: 68
End: 2023-03-07
Payer: COMMERCIAL

## 2023-03-07 VITALS
OXYGEN SATURATION: 97 % | SYSTOLIC BLOOD PRESSURE: 125 MMHG | WEIGHT: 180 LBS | HEIGHT: 62 IN | BODY MASS INDEX: 33.13 KG/M2 | TEMPERATURE: 97.2 F | DIASTOLIC BLOOD PRESSURE: 86 MMHG | HEART RATE: 65 BPM

## 2023-03-07 PROCEDURE — 95972 ALYS CPLX SP/PN NPGT W/PRGRM: CPT

## 2023-03-07 PROCEDURE — 99213 OFFICE O/P EST LOW 20 MIN: CPT | Mod: 25

## 2023-03-07 NOTE — PHYSICAL EXAM
[General Appearance - Well Developed] : well developed [General Appearance - Well Nourished] : well nourished [Normal Appearance] : normal appearance [Well Groomed] : well groomed [General Appearance - In No Acute Distress] : no acute distress [FreeTextEntry1] : IPG in good position.  [] : no respiratory distress [Respiration, Rhythm And Depth] : normal respiratory rhythm and effort Private Vehicle [Exaggerated Use Of Accessory Muscles For Inspiration] : no accessory muscle use [Oriented To Time, Place, And Person] : oriented to person, place, and time [Normal Station and Gait] : the gait and station were normal for the patient's age

## 2023-03-07 NOTE — ASSESSMENT
[FreeTextEntry1] : \par \par Impression/plan:  66 years old male patient with OAB s/p SNS, on program 2 at setting of 1.7 mA. \par \par 1. F/u in 6 months for SNS check sooner if necessary. Patient instruction again given to change setting at home if necessary. \par

## 2023-03-08 LAB
PSA FREE FLD-MCNC: 16 %
PSA FREE SERPL-MCNC: 0.15 NG/ML
PSA SERPL-MCNC: 0.9 NG/ML

## 2023-04-12 ENCOUNTER — APPOINTMENT (OUTPATIENT)
Dept: PULMONOLOGY | Facility: CLINIC | Age: 68
End: 2023-04-12
Payer: COMMERCIAL

## 2023-04-12 VITALS
WEIGHT: 180 LBS | HEIGHT: 62 IN | DIASTOLIC BLOOD PRESSURE: 76 MMHG | SYSTOLIC BLOOD PRESSURE: 121 MMHG | TEMPERATURE: 97.4 F | OXYGEN SATURATION: 97 % | BODY MASS INDEX: 33.13 KG/M2 | HEART RATE: 69 BPM

## 2023-04-12 PROCEDURE — ZZZZZ: CPT

## 2023-04-12 PROCEDURE — 94060 EVALUATION OF WHEEZING: CPT

## 2023-04-12 PROCEDURE — 94729 DIFFUSING CAPACITY: CPT

## 2023-04-12 PROCEDURE — 94727 GAS DIL/WSHOT DETER LNG VOL: CPT

## 2023-04-12 PROCEDURE — 99213 OFFICE O/P EST LOW 20 MIN: CPT | Mod: 25

## 2023-04-12 NOTE — HISTORY OF PRESENT ILLNESS
[TextBox_4] : 4-12-23 here for f/u. no complains. very complaint with pulmicort. does not need to use albuterol. very good exercise tolerance. \par \par 12-21-22 here for f/u. being worked up for retroperitoneal mass. no pulmonary complains. job has become less active. he still says he has no limitations with walking. not able to do a lot of steps but feels that it is mostly due to knee issues. has not needed rescue inhaler at all. did not get ECHO done. \par \par 6-23-22 feels better than before. able to walk 20-40 bloicks no dyspnea, only gets dyspneic when climing up 5 flihgts of stairs. rarely needs to use albuterol. using pulmicort BID. \par \par 10-15-21\par 67 yo M, never smoker with past medical history of asthma, HTN, HLD, overactive bladder s/p botox now pending stimulator placement referred to pulmonary clinic for pre-op optimization and to establish care for his asthma. \par Patient reports that he was diagnosed with asthma 3 years ago. One ER visit, about 1 week ago when he was having persistent chest tightness despite albuterol use. Went to [facility] ER, received nebs and 5 day course of oral prednisone. Since on prednisone has not needed to use his rescue inhaler. Prior to this episode has been using albuterol inhaler 2-3 times a week. Usually fall season is worse for his asthma. Never been hospitalized. \par ROS positive for hoarseness of voice, congestion and phlegm in throat. \par Of note saw Dr. Campos ( ENT ) two weeks ago but no laryngoscopy was performed. 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 \par

## 2023-04-12 NOTE — CONSULT LETTER
[Dear  ___] : Dear  [unfilled], [Courtesy Letter:] : I had the pleasure of seeing your patient, [unfilled], in my office today. [Please see my note below.] : Please see my note below. [Consult Closing:] : Thank you very much for allowing me to participate in the care of this patient.  If you have any questions, please do not hesitate to contact me. [Sincerely,] : Sincerely, [DrMargaret  ___] : Dr. JARVIS [FreeTextEntry3] : Dr. Najera

## 2023-04-12 NOTE — ASSESSMENT
[FreeTextEntry1] : Data reviewed: \par CXR done in 9-2021 at [facility]: clear lung fields\par \par PFT 10-28-21: FVC 57%, FEV1 61%, 13% improvement in FVC with bronchodilator, FEv1/FVC 0.82.TLC 68%, RV/%, DLCO 48%\par PFT 6-23-22 FVC 62%, FEv1 49%, FEv1/FVC 0.71. No significant bronchodilator response. TLC 50%, DLCO 49% \par PFT 12-21-22 FVC 72%, FEV1 67%, FEv1/FVC 0.69. no significant bronchodilator response. TLC 56%, DLCO 49% \par PFT4-12-23 FVC 62%, FEv1 65%, FEv1/FVC 0.78. No significant bronchodilator response. TLC 55%, DLCO 54% \par \par HRCT 3-15-22:\par Impression: 1. Mosaic perfusion pattern bilaterally on inspiratory images, without significant change in lung density on expiratory images. The lack of change in lung density on expiratory images could either be secondary to suboptimal patient effort versus diffuse air trapping, as may be seen with constrictive bronchiolitis.\par 2. Cluster of small lucent lesions in left upper lobe, favored to represent bronchiolectasis rather than honeycombing.\par 3. Mild cylindrical bronchiectasis bilaterally.\par 4.There are small lung nodules bilaterally, most of which appear benign.\par 5. Aneurysmal aortic root, measuring 4.8 cm.\par \par PET : No pulmonary abnormality noted.  \par \par Impression/Plan\par 1.Mild persistent asthma\par No evidence of obstruction by FEv1/FVC ratio but elevated RV/tLC and significant bronchodilator response on PFT in the past. \par - continue Pulmicort BID\par - continue albuterol as needed\par \par 2. Moderate restriction on PFT with moderately reduced diffusion capacity. \par HRCT done in 3-2022 showed mosaic perfusion which did not significantly change on expiratory images ? respiratory bronchiolitis although patient does not have obstruction on PFT and has good exercise tolerance\par \par - given excellent exercise tolerance, will just monitor pft which has been stable\par - ECHO reordered \par \par RTC in 6 months. \par

## 2023-06-04 ENCOUNTER — APPOINTMENT (OUTPATIENT)
Dept: CT IMAGING | Facility: HOSPITAL | Age: 68
End: 2023-06-04

## 2023-06-17 ENCOUNTER — OUTPATIENT (OUTPATIENT)
Dept: OUTPATIENT SERVICES | Facility: HOSPITAL | Age: 68
LOS: 1 days | End: 2023-06-17
Payer: COMMERCIAL

## 2023-06-17 ENCOUNTER — APPOINTMENT (OUTPATIENT)
Dept: CT IMAGING | Facility: HOSPITAL | Age: 68
End: 2023-06-17

## 2023-06-17 DIAGNOSIS — Z98.89 OTHER SPECIFIED POSTPROCEDURAL STATES: Chronic | ICD-10-CM

## 2023-06-17 PROCEDURE — 71260 CT THORAX DX C+: CPT

## 2023-06-17 PROCEDURE — 74177 CT ABD & PELVIS W/CONTRAST: CPT | Mod: 26

## 2023-06-17 PROCEDURE — 74177 CT ABD & PELVIS W/CONTRAST: CPT

## 2023-06-17 PROCEDURE — 71260 CT THORAX DX C+: CPT | Mod: 26

## 2023-06-25 ENCOUNTER — FORM ENCOUNTER (OUTPATIENT)
Age: 68
End: 2023-06-25

## 2023-06-26 ENCOUNTER — OUTPATIENT (OUTPATIENT)
Dept: OUTPATIENT SERVICES | Facility: HOSPITAL | Age: 68
LOS: 1 days | End: 2023-06-26
Payer: COMMERCIAL

## 2023-06-26 DIAGNOSIS — R94.2 ABNORMAL RESULTS OF PULMONARY FUNCTION STUDIES: ICD-10-CM

## 2023-06-26 DIAGNOSIS — Z98.89 OTHER SPECIFIED POSTPROCEDURAL STATES: Chronic | ICD-10-CM

## 2023-06-26 DIAGNOSIS — I10 ESSENTIAL (PRIMARY) HYPERTENSION: ICD-10-CM

## 2023-06-26 PROCEDURE — 93306 TTE W/DOPPLER COMPLETE: CPT

## 2023-06-26 PROCEDURE — 93306 TTE W/DOPPLER COMPLETE: CPT | Mod: 26

## 2023-07-20 ENCOUNTER — APPOINTMENT (OUTPATIENT)
Dept: INTERVENTIONAL RADIOLOGY/VASCULAR | Facility: HOSPITAL | Age: 68
End: 2023-07-20

## 2023-07-20 ENCOUNTER — APPOINTMENT (OUTPATIENT)
Dept: INTERVENTIONAL RADIOLOGY/VASCULAR | Facility: HOSPITAL | Age: 68
End: 2023-07-20
Payer: COMMERCIAL

## 2023-07-20 ENCOUNTER — OUTPATIENT (OUTPATIENT)
Dept: OUTPATIENT SERVICES | Facility: HOSPITAL | Age: 68
LOS: 1 days | End: 2023-07-20
Payer: COMMERCIAL

## 2023-07-20 ENCOUNTER — RESULT REVIEW (OUTPATIENT)
Age: 68
End: 2023-07-20

## 2023-07-20 DIAGNOSIS — Z98.89 OTHER SPECIFIED POSTPROCEDURAL STATES: Chronic | ICD-10-CM

## 2023-07-20 LAB
GRAM STN FLD: SIGNIFICANT CHANGE UP
SPECIMEN SOURCE: SIGNIFICANT CHANGE UP

## 2023-07-20 PROCEDURE — 88305 TISSUE EXAM BY PATHOLOGIST: CPT

## 2023-07-20 PROCEDURE — 87075 CULTR BACTERIA EXCEPT BLOOD: CPT

## 2023-07-20 PROCEDURE — 88173 CYTOPATH EVAL FNA REPORT: CPT

## 2023-07-20 PROCEDURE — 88305 TISSUE EXAM BY PATHOLOGIST: CPT | Mod: 26

## 2023-07-20 PROCEDURE — 38505 NEEDLE BIOPSY LYMPH NODES: CPT

## 2023-07-20 PROCEDURE — C1769: CPT

## 2023-07-20 PROCEDURE — 77012 CT SCAN FOR NEEDLE BIOPSY: CPT

## 2023-07-20 PROCEDURE — 88173 CYTOPATH EVAL FNA REPORT: CPT | Mod: 26

## 2023-07-20 PROCEDURE — 87070 CULTURE OTHR SPECIMN AEROBIC: CPT

## 2023-07-20 PROCEDURE — 77012 CT SCAN FOR NEEDLE BIOPSY: CPT | Mod: 26

## 2023-07-21 ENCOUNTER — APPOINTMENT (OUTPATIENT)
Dept: SURGERY | Facility: CLINIC | Age: 68
End: 2023-07-21
Payer: COMMERCIAL

## 2023-07-21 VITALS
WEIGHT: 179 LBS | OXYGEN SATURATION: 98 % | TEMPERATURE: 97.3 F | SYSTOLIC BLOOD PRESSURE: 119 MMHG | HEIGHT: 62 IN | DIASTOLIC BLOOD PRESSURE: 75 MMHG | BODY MASS INDEX: 32.94 KG/M2 | HEART RATE: 69 BPM

## 2023-07-21 DIAGNOSIS — K40.90 UNILATERAL INGUINAL HERNIA, W/OUT OBSTRUCTION OR GANGRENE, NOT SPECIFIED AS RECURRENT: ICD-10-CM

## 2023-07-21 PROCEDURE — 99203 OFFICE O/P NEW LOW 30 MIN: CPT

## 2023-07-25 LAB
CULTURE RESULTS: NO GROWTH — SIGNIFICANT CHANGE UP
SPECIMEN SOURCE: SIGNIFICANT CHANGE UP

## 2023-07-27 DIAGNOSIS — R59.0 LOCALIZED ENLARGED LYMPH NODES: ICD-10-CM

## 2023-07-27 PROBLEM — K40.90 LEFT INGUINAL HERNIA: Status: ACTIVE | Noted: 2023-07-26

## 2023-07-27 NOTE — ASSESSMENT
[FreeTextEntry1] : 68 y/o male with right inguinal hernia. Wishes to have this repaired.\par  \par We discussed the risk,benefits and alternatives to OPEN Right inguinal hernia repair with mesh in detail including but not limited to bleeding, infection, death, disability, blood clots, cardiac, pulmonary, neurological problems, prolonged hospital course, need for additional procedures, need for implants, recurrence of the hernia, displeasure with cosmetic outcome and other issues. Patient expressed understanding and wishes to proceed with surgery. Pt will need to be evaluated by cardiology prior to elective repair as he recently had an abnormal ECHO. All questions were answered. \par \par \par Plan:\par -Open Right inguinal hernia repair\par -Cardiology eval prior to elective repair\par -Obtain recent colonoscopy report

## 2023-07-27 NOTE — HISTORY OF PRESENT ILLNESS
[de-identified] : This is a 66 y/o male with pmhx of OAB (s/p SNS, ), asthma, retroperitoneal mass (s/p PET, LN bx, follows with ). presenting to the office for evaluation and management of a Right inguinal hernia. Here today with sister. He reports he first noted a bulge in the groin x 1 year ago. He reports the bulge has increased in size since first noticed. He denies any significant pain, however occasionally experiences discomfort. He denies any urinary or obstructive issues. He underwent and ultrasound and CT scan which revealed a Right inguinal hernia. He also reports he recently had a colonoscopy, he will send report once he receives it.  Has had a hernia repair open on the left some years ago.

## 2023-07-27 NOTE — PHYSICAL EXAM
PCP [Oriented to Person] : oriented to person [Oriented to Place] : oriented to place [Oriented to Time] : oriented to time [Calm] : calm [Abdominal Masses] : No abdominal masses [Abdomen Tenderness] : ~T ~M No abdominal tenderness [Tender] : was nontender [Enlarged] : not enlarged [de-identified] : NAD, comfortable [de-identified] : Normocephalic, atraumatic. No scleral icterus.  [de-identified] : Supple, no JVD or cervical lymphadenopathy.  [de-identified] : No respiratory distress  [de-identified] : Abdomen is soft, non tender and non distended. Do not appreciate any overt mass. There is a right inguinal hernia. Reducible. Scar well healed left groin. No overt hernia recurrence on left.

## 2023-09-05 ENCOUNTER — APPOINTMENT (OUTPATIENT)
Dept: UROLOGY | Facility: CLINIC | Age: 68
End: 2023-09-05
Payer: COMMERCIAL

## 2023-09-05 VITALS
SYSTOLIC BLOOD PRESSURE: 104 MMHG | HEART RATE: 77 BPM | DIASTOLIC BLOOD PRESSURE: 67 MMHG | TEMPERATURE: 97.8 F | OXYGEN SATURATION: 95 %

## 2023-09-05 PROCEDURE — 95972 ALYS CPLX SP/PN NPGT W/PRGRM: CPT

## 2023-09-05 PROCEDURE — 99214 OFFICE O/P EST MOD 30 MIN: CPT | Mod: 25

## 2023-09-05 NOTE — HISTORY OF PRESENT ILLNESS
[FreeTextEntry1] : 67 years old male patient with OAB s/p SNS, doing well but feels like room for improvement with neuromodulation. He is on program 2 at setting 1.6, increased today to 1.7, feels in testicles, comfortable.  9/5/23  68-year-old gentleman with OAB status post implant of InterStim, continues to do well, however does not feel the stimulation at this point.  His device was interrogated, he was switched to program 1 at a setting of 1.8, felt the stimulation at the base of the penis and the testicles, comfortable.  Reviewed again how to do this at home on his own, all questions answered.  Of note he is to have hernia surgery with Dr. Holbrook in the near future.

## 2023-09-05 NOTE — LETTER BODY
[Dear  ___] : Dear  [unfilled], [Courtesy Letter:] : I had the pleasure of seeing your patient, [unfilled], in my office today. [Please see my note below.] : Please see my note below. [Consult Closing:] : Thank you very much for allowing me to participate in the care of this patient.  If you have any questions, please do not hesitate to contact me. [Sincerely,] : Sincerely, [FreeTextEntry3] : Neha Muniz MD System Director Urogynecology/FPS Department of Urology [facility]    at [facility]  of Urology [facility] [employer] [facility] at [facility]

## 2023-09-05 NOTE — ASSESSMENT
[FreeTextEntry1] :   Impression/plan: 68-year-old gentleman with OAB status post implant of InterStim, continues to do well, however does not feel the stimulation at this point.  His device was interrogated, he was switched to program 1 at a setting of 1.8.  1.  Again reviewed had a change program and stimulation at home. 2.  Continue with program 1 at a setting of 1.8, adjust according to symptoms. 3. F/u in 6 months.

## 2023-09-15 ENCOUNTER — NON-APPOINTMENT (OUTPATIENT)
Age: 68
End: 2023-09-15

## 2023-10-04 ENCOUNTER — APPOINTMENT (OUTPATIENT)
Dept: PULMONOLOGY | Facility: CLINIC | Age: 68
End: 2023-10-04
Payer: COMMERCIAL

## 2023-10-04 VITALS
HEART RATE: 78 BPM | TEMPERATURE: 97.1 F | BODY MASS INDEX: 33.13 KG/M2 | WEIGHT: 180 LBS | DIASTOLIC BLOOD PRESSURE: 80 MMHG | SYSTOLIC BLOOD PRESSURE: 141 MMHG | HEIGHT: 62 IN | OXYGEN SATURATION: 95 %

## 2023-10-04 DIAGNOSIS — J45.909 UNSPECIFIED ASTHMA, UNCOMPLICATED: ICD-10-CM

## 2023-10-04 PROCEDURE — 99214 OFFICE O/P EST MOD 30 MIN: CPT

## 2023-11-12 ENCOUNTER — NON-APPOINTMENT (OUTPATIENT)
Age: 68
End: 2023-11-12

## 2024-01-18 ENCOUNTER — NON-APPOINTMENT (OUTPATIENT)
Age: 69
End: 2024-01-18

## 2024-01-26 VITALS
TEMPERATURE: 97 F | OXYGEN SATURATION: 97 % | WEIGHT: 166.45 LBS | HEIGHT: 62 IN | SYSTOLIC BLOOD PRESSURE: 125 MMHG | HEART RATE: 65 BPM | DIASTOLIC BLOOD PRESSURE: 77 MMHG | RESPIRATION RATE: 16 BRPM

## 2024-01-26 NOTE — ASU PATIENT PROFILE, ADULT - NSICDXPASTMEDICALHX_GEN_ALL_CORE_FT
PAST MEDICAL HISTORY:  HTN (hypertension)     Hydrocele     Quadriceps muscle rupture right     PAST MEDICAL HISTORY:  Asthma     HLD (hyperlipidemia)     [place] (hard of hearing)     HTN (hypertension)     Hydrocele     Quadriceps muscle rupture right

## 2024-01-26 NOTE — ASU PATIENT PROFILE, ADULT - NSICDXPASTSURGICALHX_GEN_ALL_CORE_FT
PAST SURGICAL HISTORY:  H/O hernia repair right    H/O right knee surgery 5/18/16     PAST SURGICAL HISTORY:  H/O hernia repair right    H/O right knee surgery 5/18/16    S/P implantation of urinary electronic stimulator device H/O

## 2024-01-26 NOTE — ASU PATIENT PROFILE, ADULT - FALL HARM RISK - HARM RISK INTERVENTIONS

## 2024-01-26 NOTE — ASU PATIENT PROFILE, ADULT - ABILITY TO HEAR (WITH HEARING AID OR HEARING APPLIANCE IF NORMALLY USED):
Adequate: hears normal conversation without difficulty Wears B/L Hearing Aids/Mildly to Moderately Impaired: difficulty hearing in some environments or speaker may need to increase volume or speak distinctly

## 2024-01-28 ENCOUNTER — TRANSCRIPTION ENCOUNTER (OUTPATIENT)
Age: 69
End: 2024-01-28

## 2024-01-29 ENCOUNTER — APPOINTMENT (OUTPATIENT)
Dept: SURGERY | Facility: HOSPITAL | Age: 69
End: 2024-01-29

## 2024-01-29 ENCOUNTER — TRANSCRIPTION ENCOUNTER (OUTPATIENT)
Age: 69
End: 2024-01-29

## 2024-01-29 ENCOUNTER — OUTPATIENT (OUTPATIENT)
Dept: OUTPATIENT SERVICES | Facility: HOSPITAL | Age: 69
LOS: 1 days | Discharge: HOME | End: 2024-01-29
Payer: COMMERCIAL

## 2024-01-29 DIAGNOSIS — Z98.89 OTHER SPECIFIED POSTPROCEDURAL STATES: Chronic | ICD-10-CM

## 2024-01-29 DIAGNOSIS — Z96.0 PRESENCE OF UROGENITAL IMPLANTS: Chronic | ICD-10-CM

## 2024-01-29 PROCEDURE — 49505 PRP I/HERN INIT REDUC >5 YR: CPT | Mod: GC

## 2024-01-29 DEVICE — PLUG AND PATCH PHASIX LG 4.1X4.8CM: Type: IMPLANTABLE DEVICE | Site: RIGHT | Status: FUNCTIONAL

## 2024-01-29 RX ORDER — ONDANSETRON 8 MG/1
4 TABLET, FILM COATED ORAL EVERY 6 HOURS
Refills: 0 | Status: DISCONTINUED | OUTPATIENT
Start: 2024-01-29 | End: 2024-01-30

## 2024-01-29 RX ORDER — ALBUTEROL 90 UG/1
2 AEROSOL, METERED ORAL
Refills: 0 | DISCHARGE

## 2024-01-29 RX ORDER — ACETAMINOPHEN 500 MG
1000 TABLET ORAL EVERY 6 HOURS
Refills: 0 | Status: DISCONTINUED | OUTPATIENT
Start: 2024-01-29 | End: 2024-01-30

## 2024-01-29 RX ORDER — BUDESONIDE, MICRONIZED 100 %
1 POWDER (GRAM) MISCELLANEOUS
Refills: 0 | DISCHARGE

## 2024-01-29 RX ORDER — TAMSULOSIN HYDROCHLORIDE 0.4 MG/1
0.4 CAPSULE ORAL ONCE
Refills: 0 | Status: COMPLETED | OUTPATIENT
Start: 2024-01-29 | End: 2024-01-29

## 2024-01-29 RX ORDER — IBUPROFEN 200 MG
600 TABLET ORAL EVERY 6 HOURS
Refills: 0 | Status: DISCONTINUED | OUTPATIENT
Start: 2024-01-29 | End: 2024-01-29

## 2024-01-29 RX ADMIN — Medication 1000 MILLIGRAM(S): at 21:01

## 2024-01-29 RX ADMIN — TAMSULOSIN HYDROCHLORIDE 0.4 MILLIGRAM(S): 0.4 CAPSULE ORAL at 19:07

## 2024-01-29 RX ADMIN — Medication 1000 MILLIGRAM(S): at 19:04

## 2024-01-29 NOTE — H&P ADULT - ASSESSMENT
Mr Anne is a 69 y/o M with PMH HTN, HL, OAB s/p Interstim placement, asthma, retroperitoneal mass (s/p PET, LN bx, follows with ) who presents for management of symptomatic inguinal hernia.     - Proceed to OR

## 2024-01-29 NOTE — BRIEF OPERATIVE NOTE - OPERATION/FINDINGS
Procedure: Open RIGHT inguinal hernia with mesh    Incision over inguinal ligament. External oblique fascia opened with preservation of II nerve. Cord structures and sac encircled. Hernia sac opened and contents reduced. Phasix plug placed through defect and secured with PDS. Floor reinforced with Phasix mesh and secured with PDS. EO fascia closed with vicryl. Abdominal wall closed in layers. Skin with monocryl

## 2024-01-29 NOTE — ASU DISCHARGE PLAN (ADULT/PEDIATRIC) - ASU DC SPECIAL INSTRUCTIONSFT
- You may resume a regular diet as tolerated  - You may shower, let the water run over the dressings, but refrain from excessive scrubbing, or soaking  - Please refrain from heavy lifting >20 lbs for six weeks following discharge  - You may take Tylenol, 1 gram every 6 hours for pain.   - Please follow up with the surgeon in the office, call for an appointment

## 2024-01-29 NOTE — H&P ADULT - NSICDXPASTMEDICALHX_GEN_ALL_CORE_FT
PAST MEDICAL HISTORY:  Asthma     HLD (hyperlipidemia)     [place] (hard of hearing)     HTN (hypertension)     Hydrocele     Quadriceps muscle rupture right

## 2024-01-29 NOTE — ASU DISCHARGE PLAN (ADULT/PEDIATRIC) - CARE PROVIDER_API CALL
Yang Holbrook  Surgery  [address]  [place] [address]  Phone: [phone]  Fax: [phone]  Follow Up Time: 1 week

## 2024-01-29 NOTE — H&P ADULT - NSHPPHYSICALEXAM_GEN_ALL_CORE
vomiting x 1 hour.  pt recently treated for croup.
Physical Exam:  General: NAD, resting comfortably in bed  Pulmonary: Nonlabored, no respiratory distress  Cardiovascular: regular rate and rhythm   Abdominal: soft, NT/ND  Extremities: WWP, normal strength  Neuro: A/O x 3, no focal deficits, normal motor/sensation  Pulses: palpable distal pulses

## 2024-01-29 NOTE — ASU DISCHARGE PLAN (ADULT/PEDIATRIC) - NS MD DC FALL RISK RISK
For information on Fall & Injury Prevention, visit: [email or web address] OR  [email or web address] OR  https://www.cdc.gov/steadi/patient.html no No

## 2024-01-29 NOTE — H&P ADULT - NSICDXPASTSURGICALHX_GEN_ALL_CORE_FT
PAST SURGICAL HISTORY:  H/O hernia repair right    H/O right knee surgery 5/18/16    S/P implantation of urinary electronic stimulator device H/O

## 2024-01-29 NOTE — H&P ADULT - HISTORY OF PRESENT ILLNESS
Mr Omid is a 69 y/o M with PMH HTN, HL, OAB s/p Interstim placement, asthma, retroperitoneal mass (s/p PET, LN bx, follows with ) who presents for management of symptomatic inguinal hernia.     - Preoperative CCTA calcium score 0  - Preoperative echo reportedly with mrEF- started on Entresto

## 2024-01-29 NOTE — BRIEF OPERATIVE NOTE - NSICDXBRIEFPROCEDURE_GEN_ALL_CORE_FT
PROCEDURES:  Open repair of recurrent inguinal hernia using synthetic mesh 29-Jan-2024 14:16:42  Jose Angel Chairez

## 2024-01-29 NOTE — PROGRESS NOTE ADULT - SUBJECTIVE AND OBJECTIVE BOX
Team 4 Surgery Post-Op Note, PCN:     Procedure: Open repair of recurrent inguinal hernia using synthetic mesh    Surgeon: Dr. Holbrook    Subjective: Patient examined bedside without acute complaints. Denies HA, dizziness, CP, SOB. Tolerating crackers in PACU.     Vital Signs Last 24 Hrs  T(C): 36.8 (29 Jan 2024 21:58), Max: 36.9 (29 Jan 2024 14:12)  T(F): 98.3 (29 Jan 2024 21:58), Max: 98.4 (29 Jan 2024 14:12)  HR: 56 (29 Jan 2024 21:58) (54 - 65)  BP: 103/66 (29 Jan 2024 21:58) (84/54 - 125/77)  BP(mean): 75 (29 Jan 2024 20:52) (62 - 75)  RR: 18 (29 Jan 2024 21:58) (12 - 24)  SpO2: 93% (29 Jan 2024 21:58) (92% - 99%)    Parameters below as of 29 Jan 2024 21:58  Patient On (Oxygen Delivery Method): room air        Physical Exam:  General: NAD, resting comfortably in bed  Pulmonary: Nonlabored breathing, no respiratory distress  Cardiovascular: NSR  Abdominal: soft, NTND, R groin incision CDI  Extremities: WWP, normal strength  Neuro: A/O x 3, CNs II-XII grossly intact, no focal deficits, normal motor/sensation  Pulses: palpable distal pulses          Radiology and Additional Studies:    Assessment:68y Male s/p above procedure    23 hr obs  Reg diet  Pain & nausea prn  SQH/SCD/IS/OOBA  Dispo: home in AM

## 2024-01-30 ENCOUNTER — TRANSCRIPTION ENCOUNTER (OUTPATIENT)
Age: 69
End: 2024-01-30

## 2024-01-30 VITALS
OXYGEN SATURATION: 95 % | DIASTOLIC BLOOD PRESSURE: 69 MMHG | HEART RATE: 66 BPM | TEMPERATURE: 98 F | RESPIRATION RATE: 18 BRPM | SYSTOLIC BLOOD PRESSURE: 114 MMHG

## 2024-01-30 PROBLEM — J45.909 UNSPECIFIED ASTHMA, UNCOMPLICATED: Chronic | Status: ACTIVE | Noted: 2024-01-29

## 2024-01-30 PROBLEM — H91.90 UNSPECIFIED HEARING LOSS, UNSPECIFIED EAR: Chronic | Status: ACTIVE | Noted: 2024-01-29

## 2024-01-30 PROBLEM — E78.5 HYPERLIPIDEMIA, UNSPECIFIED: Chronic | Status: ACTIVE | Noted: 2024-01-29

## 2024-01-30 PROCEDURE — C9399: CPT

## 2024-01-30 PROCEDURE — C1781: CPT

## 2024-01-30 PROCEDURE — 49505 PRP I/HERN INIT REDUC >5 YR: CPT | Mod: RT

## 2024-01-30 RX ORDER — HEPARIN SODIUM 5000 [USP'U]/ML
5000 INJECTION INTRAVENOUS; SUBCUTANEOUS EVERY 8 HOURS
Refills: 0 | Status: DISCONTINUED | OUTPATIENT
Start: 2024-01-30 | End: 2024-01-30

## 2024-01-30 RX ADMIN — HEPARIN SODIUM 5000 UNIT(S): 5000 INJECTION INTRAVENOUS; SUBCUTANEOUS at 06:19

## 2024-01-30 RX ADMIN — Medication 1000 MILLIGRAM(S): at 00:49

## 2024-01-30 NOTE — PROGRESS NOTE ADULT - ASSESSMENT
68 y/o male with pmhx of OAB (s/p SNS, ), asthma, retroperitoneal mass (s/p PET, LN bx, follows with ). presenting for elective Right inguinal hernia repair. Now sp open right inguinal hernia repair w mesh 1/20    Reg diet  Pain & nausea prn  SQH/SCD/IS/OOBA  23 hr obs

## 2024-01-30 NOTE — PROGRESS NOTE ADULT - SUBJECTIVE AND OBJECTIVE BOX
SUBJECTIVE:  Pt seen on rounds this AM. Pt endorsing feeling well, no acute complaints, pain well controlled w/ current regimen.     MEDICATIONS  (STANDING):  acetaminophen     Tablet .. 1000 milliGRAM(s) Oral every 6 hours  heparin   Injectable 5000 Unit(s) SubCutaneous every 8 hours    MEDICATIONS  (PRN):  ondansetron Injectable 4 milliGRAM(s) IV Push every 6 hours PRN Nausea and/or Vomiting      Vital Signs Last 24 Hrs  T(C): 37 (30 Jan 2024 04:24), Max: 37 (30 Jan 2024 04:24)  T(F): 98.6 (30 Jan 2024 04:24), Max: 98.6 (30 Jan 2024 04:24)  HR: 66 (30 Jan 2024 04:24) (54 - 66)  BP: 106/67 (30 Jan 2024 04:24) (84/54 - 125/77)  BP(mean): 75 (29 Jan 2024 20:52) (62 - 75)  RR: 17 (30 Jan 2024 04:24) (12 - 24)  SpO2: 95% (30 Jan 2024 04:24) (92% - 99%)    Parameters below as of 30 Jan 2024 04:24  Patient On (Oxygen Delivery Method): room air        Physical Exam:  General: NAD, resting comfortably in bed  Pulmonary: Nonlabored breathing, no respiratory distress  Cardiovascular: NSR  Abdominal: soft, NT/ND  Extremities: WWP, normal strength  Neuro: A/O x 3, CNs II-XII grossly intact, no focal deficits    I&O's Summary    29 Jan 2024 07:01  -  30 Jan 2024 07:00  --------------------------------------------------------  IN: 240 mL / OUT: 700 mL / NET: -460 mL        LABS:              CAPILLARY BLOOD GLUCOSE            RADIOLOGY & ADDITIONAL STUDIES:   SUBJECTIVE:  Pt seen on rounds this AM. Pt endorsing feeling well, no acute complaints, pain well controlled w/ current regimen. -n/-v, urinating w/ no issue     MEDICATIONS  (STANDING):  acetaminophen     Tablet .. 1000 milliGRAM(s) Oral every 6 hours  heparin   Injectable 5000 Unit(s) SubCutaneous every 8 hours    MEDICATIONS  (PRN):  ondansetron Injectable 4 milliGRAM(s) IV Push every 6 hours PRN Nausea and/or Vomiting      Vital Signs Last 24 Hrs  T(C): 37 (30 Jan 2024 04:24), Max: 37 (30 Jan 2024 04:24)  T(F): 98.6 (30 Jan 2024 04:24), Max: 98.6 (30 Jan 2024 04:24)  HR: 66 (30 Jan 2024 04:24) (54 - 66)  BP: 106/67 (30 Jan 2024 04:24) (84/54 - 125/77)  BP(mean): 75 (29 Jan 2024 20:52) (62 - 75)  RR: 17 (30 Jan 2024 04:24) (12 - 24)  SpO2: 95% (30 Jan 2024 04:24) (92% - 99%)    Parameters below as of 30 Jan 2024 04:24  Patient On (Oxygen Delivery Method): room air        Physical Exam:  General: NAD, resting comfortably in bed  Pulmonary: Nonlabored breathing, no respiratory distress  Cardiovascular: NSR  Abdominal: soft, NT/ND, incision cdi  Extremities: WWP, normal strength  Neuro: A/O x 3, CNs II-XII grossly intact, no focal deficits    I&O's Summary    29 Jan 2024 07:01  -  30 Jan 2024 07:00  --------------------------------------------------------  IN: 240 mL / OUT: 700 mL / NET: -460 mL        LABS:              CAPILLARY BLOOD GLUCOSE            RADIOLOGY & ADDITIONAL STUDIES:

## 2024-01-30 NOTE — DISCHARGE NOTE NURSING/CASE MANAGEMENT/SOCIAL WORK - PATIENT PORTAL LINK FT
You can access the FollowMyHealth Patient Portal offered by [facility] by registering at the following website: [email or web address]. By joining [employer]’s FollowMyHealth portal, you will also be able to view your health information using other applications (apps) compatible with our system.

## 2024-02-09 ENCOUNTER — APPOINTMENT (OUTPATIENT)
Dept: SURGERY | Facility: CLINIC | Age: 69
End: 2024-02-09
Payer: COMMERCIAL

## 2024-02-09 VITALS
TEMPERATURE: 97.6 F | WEIGHT: 164 LBS | OXYGEN SATURATION: 96 % | SYSTOLIC BLOOD PRESSURE: 130 MMHG | HEIGHT: 62 IN | HEART RATE: 79 BPM | BODY MASS INDEX: 30.18 KG/M2 | DIASTOLIC BLOOD PRESSURE: 80 MMHG

## 2024-02-09 PROCEDURE — 99024 POSTOP FOLLOW-UP VISIT: CPT

## 2024-02-14 NOTE — HISTORY OF PRESENT ILLNESS
[de-identified] : This is a 66 y/o male with pmhx of OAB (s/p SNS, ), asthma, retroperitoneal mass (s/p PET, LN bx, follows with ). presenting to the office for evaluation and management of a Right inguinal hernia. Here today with sister. He reports he first noted a bulge in the groin x 1 year ago. He reports the bulge has increased in size since first noticed. He denies any significant pain, however occasionally experiences discomfort. He denies any urinary or obstructive issues. He underwent and ultrasound and CT scan which revealed a Right inguinal hernia. He also reports he recently had a colonoscopy, he will send report once he receives it.  Has had a hernia repair open on the left some years ago. [de-identified] : 2-9-24: Patient returns today for a routine post operative visit. Now s/p open Right inguinal hernia repair with mesh on 1/29/24. He states he is overall feeling well. He states he is eating and drinking as usual. He is having regular bowel movements and voiding as usual.  Denies any discomfort or pain. He is ambulating often with no issues. Has been wearing athletic supporter/jockstrap since surgery. Denies fever, chest pain, shortness of breath, nausea, vomiting or constipation.

## 2024-02-14 NOTE — PHYSICAL EXAM
[Oriented to Person] : oriented to person [Oriented to Place] : oriented to place [Oriented to Time] : oriented to time [Calm] : calm [Abdominal Masses] : No abdominal masses [Abdomen Tenderness] : ~T ~M No abdominal tenderness [Tender] : was nontender [Enlarged] : not enlarged [de-identified] : NAD, comfortable [de-identified] : Normocephalic, atraumatic. No scleral icterus.  [de-identified] : Supple, no JVD or cervical lymphadenopathy.  [de-identified] : No respiratory distress  [de-identified] : Abdomen is soft, non tender and non distended. Do not appreciate any overt mass.  Incision healing well, c/d/i. No surrounding erythema or induration. Typical healing ridge noted. No evidence of hernia recurrence on exam with valsalva.

## 2024-02-14 NOTE — ASSESSMENT
[FreeTextEntry1] : 69 y/o male with right inguinal hernia, now s/p repair. Patient seems to be doing well post operatively and recovering as expected. Discussed gradual return to normal activity and natural scar maturation. Discussed post op recommendations of no heavy lifting x 6 weeks after surgery. He expressed understanding. He will RTC 1 week for checkup. All questions answered.    Plan: -RTC 1 week

## 2024-02-16 ENCOUNTER — APPOINTMENT (OUTPATIENT)
Dept: SURGERY | Facility: CLINIC | Age: 69
End: 2024-02-16
Payer: COMMERCIAL

## 2024-02-16 VITALS
SYSTOLIC BLOOD PRESSURE: 116 MMHG | TEMPERATURE: 97.5 F | BODY MASS INDEX: 29.63 KG/M2 | DIASTOLIC BLOOD PRESSURE: 76 MMHG | WEIGHT: 161 LBS | HEART RATE: 64 BPM | OXYGEN SATURATION: 96 % | HEIGHT: 62 IN

## 2024-02-16 PROCEDURE — 99024 POSTOP FOLLOW-UP VISIT: CPT

## 2024-02-21 NOTE — PHYSICAL EXAM
[Abdominal Masses] : No abdominal masses [Abdomen Tenderness] : ~T ~M No abdominal tenderness [Tender] : was nontender [Enlarged] : not enlarged [Oriented to Person] : oriented to person [Oriented to Place] : oriented to place [Oriented to Time] : oriented to time [Calm] : calm [de-identified] : NAD, comfortable [de-identified] : Normocephalic, atraumatic. No scleral icterus.  [de-identified] : Supple, no JVD or cervical lymphadenopathy.  [de-identified] : No respiratory distress  [de-identified] : Abdomen is soft, non tender and non distended. Do not appreciate any overt mass.  Incision healing well, c/d/i. No surrounding erythema or induration. Typical healing ridge noted. No evidence of hernia recurrence on exam with Valsalva.

## 2024-02-21 NOTE — HISTORY OF PRESENT ILLNESS
[de-identified] : This is a 68 y/o male with pmhx of OAB (s/p SNS, ), asthma, retroperitoneal mass (s/p PET, LN bx, follows with ). presenting to the office for evaluation and management of a Right inguinal hernia. Here today with sister. He reports he first noted a bulge in the groin x 1 year ago. He reports the bulge has increased in size since first noticed. He denies any significant pain, however occasionally experiences discomfort. He denies any urinary or obstructive issues. He underwent and ultrasound and CT scan which revealed a Right inguinal hernia. He also reports he recently had a colonoscopy, he will send report once he receives it.  Has had a hernia repair open on the left some years ago. [de-identified] : 2-9-24: Patient returns today for a routine post operative visit. Now s/p open Right inguinal hernia repair with mesh on 1/29/24. He states he is overall feeling well. He states he is eating and drinking as usual. He is having regular bowel movements and voiding as usual.  Denies any discomfort or pain. He is ambulating often with no issues. Has been wearing athletic supporter/jockstrap since surgery. Denies fever, chest pain, shortness of breath, nausea, vomiting or constipation.   2-16-24: Seen today in office. He states he is overall doing well. He does not have any significant pain, has occasional discomfort in the Right groin with certain movements. This is improving. He reports he is ambulating often with no issues. Having daily bowel movements and voiding as usual. Denies fever, chest pain, shortness of breath, nausea, vomiting or constipation.

## 2024-02-21 NOTE — ASSESSMENT
[FreeTextEntry1] : 67 y/o male with right inguinal hernia, now s/p Open Right inguinal hernia repair with mesh on 1/29/24. He continues to do well post operatively and recover as expected. Discussed recommendation of no heavy lifting >10lbs x 6 weeks post operatively. He expressed understanding. He will f/u 2 weeks for check up. All questions answered.   Plan: RTC 2 weeks

## 2024-03-01 ENCOUNTER — APPOINTMENT (OUTPATIENT)
Dept: SURGERY | Facility: CLINIC | Age: 69
End: 2024-03-01
Payer: COMMERCIAL

## 2024-03-01 VITALS
OXYGEN SATURATION: 98 % | BODY MASS INDEX: 30.36 KG/M2 | HEART RATE: 86 BPM | DIASTOLIC BLOOD PRESSURE: 78 MMHG | TEMPERATURE: 97.6 F | WEIGHT: 165 LBS | HEIGHT: 62 IN | SYSTOLIC BLOOD PRESSURE: 123 MMHG

## 2024-03-01 DIAGNOSIS — K40.90 UNILATERAL INGUINAL HERNIA, W/OUT OBSTRUCTION OR GANGRENE, NOT SPECIFIED AS RECURRENT: ICD-10-CM

## 2024-03-01 PROCEDURE — 99024 POSTOP FOLLOW-UP VISIT: CPT

## 2024-03-04 PROBLEM — K40.90 RIGHT INGUINAL HERNIA: Status: ACTIVE | Noted: 2023-07-27

## 2024-03-04 NOTE — PHYSICAL EXAM
[Abdominal Masses] : No abdominal masses [Abdomen Tenderness] : ~T ~M No abdominal tenderness [Enlarged] : not enlarged [Tender] : was nontender [Oriented to Person] : oriented to person [Oriented to Place] : oriented to place [Oriented to Time] : oriented to time [Calm] : calm [de-identified] : NAD, comfortable [de-identified] : Normocephalic, atraumatic. No scleral icterus.  [de-identified] : Supple, no JVD or cervical lymphadenopathy.  [de-identified] : No respiratory distress  [de-identified] : Abdomen is soft, non tender and non distended. Do not appreciate any overt mass.  Incision healing well, c/d/i. No surrounding erythema or induration. Typical healing ridge noted. No evidence of hernia recurrence on exam with Valsalva.

## 2024-03-04 NOTE — ASSESSMENT
[FreeTextEntry1] : 67 y/o male with right inguinal hernia, now s/p Open Right inguinal hernia repair with mesh on 1/29/24. Continuing to do well post operatively. Discussed gradual return to normal activity and natural scar maturation. Discussed f/u 6 months. All questions answered.  Plan: -F/u 6 months

## 2024-03-04 NOTE — HISTORY OF PRESENT ILLNESS
[de-identified] : This is a 66 y/o male with pmhx of OAB (s/p SNS, ), asthma, retroperitoneal mass (s/p PET, LN bx, follows with ). presenting to the office for evaluation and management of a Right inguinal hernia. Here today with sister. He reports he first noted a bulge in the groin x 1 year ago. He reports the bulge has increased in size since first noticed. He denies any significant pain, however occasionally experiences discomfort. He denies any urinary or obstructive issues. He underwent and ultrasound and CT scan which revealed a Right inguinal hernia. He also reports he recently had a colonoscopy, he will send report once he receives it.  Has had a hernia repair open on the left some years ago. [de-identified] : 2-9-24: Patient returns today for a routine post operative visit. Now s/p open Right inguinal hernia repair with mesh on 1/29/24. He states he is overall feeling well. He states he is eating and drinking as usual. He is having regular bowel movements and voiding as usual.  Denies any discomfort or pain. He is ambulating often with no issues. Has been wearing athletic supporter/jockstrap since surgery. Denies fever, chest pain, shortness of breath, nausea, vomiting or constipation.   2-16-24: Seen today in office. He states he is overall doing well. He does not have any significant pain, has occasional discomfort in the Right groin with certain movements. This is improving. He reports he is ambulating often with no issues. Having daily bowel movements and voiding as usual. Denies fever, chest pain, shortness of breath, nausea, vomiting or constipation.   3-1-24: Patient reports he is feeling well. He states he has been walking more often with no issues. He does not have any pain or discomfort. He reports he is continuing to have daily bowel movements and is voiding as usual. Denies fever, chest pain, shortness of breath, nausea, vomiting or constipation.

## 2024-03-05 ENCOUNTER — APPOINTMENT (OUTPATIENT)
Dept: UROLOGY | Facility: CLINIC | Age: 69
End: 2024-03-05
Payer: COMMERCIAL

## 2024-03-05 VITALS
OXYGEN SATURATION: 96 % | HEART RATE: 77 BPM | DIASTOLIC BLOOD PRESSURE: 65 MMHG | SYSTOLIC BLOOD PRESSURE: 116 MMHG | TEMPERATURE: 97.3 F

## 2024-03-05 DIAGNOSIS — N32.81 OVERACTIVE BLADDER: ICD-10-CM

## 2024-03-05 PROCEDURE — 95972 ALYS CPLX SP/PN NPGT W/PRGRM: CPT

## 2024-03-05 PROCEDURE — 99213 OFFICE O/P EST LOW 20 MIN: CPT | Mod: 25

## 2024-03-07 PROBLEM — N32.81 OVERACTIVE BLADDER: Status: ACTIVE | Noted: 2019-10-28

## 2024-03-07 NOTE — HISTORY OF PRESENT ILLNESS
[FreeTextEntry1] : 67 years old male patient with OAB s/p SNS, doing well but feels like room for improvement with neuromodulation. He is on program 2 at setting 1.6, increased today to 1.7, feels in testicles, comfortable.  9/5/23  68-year-old gentleman with OAB status post implant of InterStim, continues to do well, however does not feel the stimulation at this point. His device was interrogated, he was switched to program 1 at a setting of 1.8, felt the stimulation at the base of the penis and the testicles, comfortable. Reviewed again how to do this at home on his own, all questions answered.  Of note he is to have hernia surgery with Dr. Holbrook in the near future.  3/5/24  68-year-old gentleman with OAB status post implant of InterStim, continues to do well, however does not feel the stimulation at this point. He is s/p hernia surgery, healing well. His device changed program 1 to 2.0 amplitude.   Plan: Continue Interstim at setting, may increase based on symptoms. F/u in 3-4 months.

## 2024-04-10 ENCOUNTER — APPOINTMENT (OUTPATIENT)
Dept: PULMONOLOGY | Facility: CLINIC | Age: 69
End: 2024-04-10
Payer: COMMERCIAL

## 2024-04-10 VITALS
HEART RATE: 77 BPM | HEIGHT: 62 IN | BODY MASS INDEX: 30.36 KG/M2 | OXYGEN SATURATION: 96 % | DIASTOLIC BLOOD PRESSURE: 70 MMHG | WEIGHT: 165 LBS | TEMPERATURE: 97.7 F | SYSTOLIC BLOOD PRESSURE: 122 MMHG

## 2024-04-10 DIAGNOSIS — Z23 ENCOUNTER FOR IMMUNIZATION: ICD-10-CM

## 2024-04-10 PROCEDURE — 94060 EVALUATION OF WHEEZING: CPT

## 2024-04-10 PROCEDURE — 94727 GAS DIL/WSHOT DETER LNG VOL: CPT

## 2024-04-10 PROCEDURE — 90677 PCV20 VACCINE IM: CPT

## 2024-04-10 PROCEDURE — G0009: CPT

## 2024-04-10 PROCEDURE — 94729 DIFFUSING CAPACITY: CPT

## 2024-04-10 PROCEDURE — 99213 OFFICE O/P EST LOW 20 MIN: CPT | Mod: 25

## 2024-04-10 RX ORDER — BUDESONIDE 90 UG/1
90 AEROSOL, POWDER RESPIRATORY (INHALATION)
Qty: 1 | Refills: 11 | Status: ACTIVE | COMMUNITY
Start: 2021-10-15 | End: 1900-01-01

## 2024-04-10 RX ORDER — ALBUTEROL SULFATE 90 UG/1
108 (90 BASE) AEROSOL, METERED RESPIRATORY (INHALATION)
Qty: 1 | Refills: 6 | Status: ACTIVE | COMMUNITY
Start: 2021-10-14 | End: 1900-01-01

## 2024-04-10 NOTE — HISTORY OF PRESENT ILLNESS
[TextBox_4] : 04/10/2024: New pt to me today last seen by Dr Najera in 10/2023, never smoker with asthma on Pulmicort. He continues on Pulmicort bid but pays $45 per inhaler which is difficult given his other medication costs - he goes through one every 3 mos. No interval exacerbations. He's not needing albuterol at all. Dr Najera's initial note reviewed re the dx of asthma which was made about 3 years before he meet her in 2021, at which time he's had wheezing and albuterol use, and an ED visit for an exacerbation, and all of this improved on Pulmicort.

## 2024-04-10 NOTE — ASSESSMENT
[FreeTextEntry1] : Data reviewed:  CT chest [facility] 3/2022: mosaic attenuation, mild cylindrical bronchiectasis  PFT 4/2023: FVC 1.58L (62%), FEV1 1.23L (65%), TLC 2.82L (55%), DLCO 12.37 (54%) PFT 04/10/2024 : FVC 1.56L (62%), FEV1 1.14L (60%), TLC 2.73L (54%), DLCO 11.53 (50%)  Impression: Asthma by clinical history Stable on Pulmicort  Plan: Continue Pulmicort. PCV20 given. Can follow in 6-12 mos.

## 2024-04-10 NOTE — CONSULT LETTER
[Dear  ___] : Dear  [unfilled], [Courtesy Letter:] : I had the pleasure of seeing your patient, [unfilled], in my office today. [Please see my note below.] : Please see my note below. [Consult Closing:] : Thank you very much for allowing me to participate in the care of this patient.  If you have any questions, please do not hesitate to contact me. [Sincerely,] : Sincerely, [FreeTextEntry2] : Tavares Reid  [address] [place] [phone] [FreeTextEntry3] : Geeta Hobson MD, [facility]

## 2024-07-02 ENCOUNTER — APPOINTMENT (OUTPATIENT)
Dept: UROLOGY | Facility: CLINIC | Age: 69
End: 2024-07-02

## 2024-07-30 ENCOUNTER — APPOINTMENT (OUTPATIENT)
Dept: UROLOGY | Facility: CLINIC | Age: 69
End: 2024-07-30
Payer: COMMERCIAL

## 2024-07-30 DIAGNOSIS — N32.81 OVERACTIVE BLADDER: ICD-10-CM

## 2024-07-30 PROCEDURE — 95971 ALYS SMPL SP/PN NPGT W/PRGRM: CPT

## 2024-07-30 PROCEDURE — 99213 OFFICE O/P EST LOW 20 MIN: CPT | Mod: 25

## 2024-08-04 NOTE — HISTORY OF PRESENT ILLNESS
[FreeTextEntry1] : 67 years old male patient with OAB s/p SNS, doing well but feels like room for improvement with neuromodulation. He is on program 2 at setting 1.6, increased today to 1.7, feels in testicles, comfortable.  9/5/23  68-year-old gentleman with OAB status post implant of InterStim, continues to do well, however does not feel the stimulation at this point. His device was interrogated, he was switched to program 1 at a setting of 1.8, felt the stimulation at the base of the penis and the testicles, comfortable. Reviewed again how to do this at home on his own, all questions answered.  Of note he is to have hernia surgery with Dr. Holbrook in the near future.  3/5/24  68-year-old gentleman with OAB status post implant of InterStim, continues to do well, however does not feel the stimulation at this point. He is s/p hernia surgery, healing well. His device changed program 1 to 2.0 amplitude.  Plan: Continue Interstim at setting, may increase based on symptoms. F/u in 3-4 months.  7/30/24  69-year-old gentleman with OAB status post implant of InterStim, continues to do well, however does not feel the stimulation at this point. He is s/p hernia surgery, healing well. His device changed program 2 to 1.2 amplitude.  Plan: Continue Interstim at setting, may increase based on symptoms. F/u in 3-4 months.

## 2024-09-06 ENCOUNTER — APPOINTMENT (OUTPATIENT)
Dept: SURGERY | Facility: CLINIC | Age: 69
End: 2024-09-06

## 2024-10-04 ENCOUNTER — APPOINTMENT (OUTPATIENT)
Dept: SURGERY | Facility: CLINIC | Age: 69
End: 2024-10-04

## 2024-10-25 ENCOUNTER — APPOINTMENT (OUTPATIENT)
Dept: SURGERY | Facility: CLINIC | Age: 69
End: 2024-10-25

## 2024-11-13 ENCOUNTER — APPOINTMENT (OUTPATIENT)
Dept: UROLOGY | Facility: CLINIC | Age: 69
End: 2024-11-13
Payer: SELF-PAY

## 2024-11-13 VITALS
HEART RATE: 68 BPM | TEMPERATURE: 98 F | DIASTOLIC BLOOD PRESSURE: 80 MMHG | OXYGEN SATURATION: 96 % | SYSTOLIC BLOOD PRESSURE: 132 MMHG | RESPIRATION RATE: 16 BRPM

## 2024-11-13 DIAGNOSIS — N32.81 OVERACTIVE BLADDER: ICD-10-CM

## 2024-11-13 PROCEDURE — 99214 OFFICE O/P EST MOD 30 MIN: CPT | Mod: 25

## 2024-11-13 PROCEDURE — 95972 ALYS CPLX SP/PN NPGT W/PRGRM: CPT

## 2025-01-07 NOTE — HISTORY OF PRESENT ILLNESS
[FreeTextEntry1] : 66 year-old male s/p 200 units Botox injection 5/10 present to the office with recurrence of daytime freq, urgency and nocturia x 3-5. He would like to discuss other options. \par  PAST SURGICAL HISTORY:  S/P cholecystectomy

## 2025-04-08 ENCOUNTER — NON-APPOINTMENT (OUTPATIENT)
Age: 70
End: 2025-04-08

## 2025-04-08 ENCOUNTER — APPOINTMENT (OUTPATIENT)
Dept: DERMATOLOGY | Facility: CLINIC | Age: 70
End: 2025-04-08
Payer: MEDICARE

## 2025-04-08 ENCOUNTER — APPOINTMENT (OUTPATIENT)
Dept: UROLOGY | Facility: CLINIC | Age: 70
End: 2025-04-08
Payer: MEDICARE

## 2025-04-08 VITALS — OXYGEN SATURATION: 96 % | HEART RATE: 58 BPM | TEMPERATURE: 97.5 F

## 2025-04-08 VITALS — SYSTOLIC BLOOD PRESSURE: 167 MMHG | DIASTOLIC BLOOD PRESSURE: 89 MMHG

## 2025-04-08 DIAGNOSIS — L85.3 XEROSIS CUTIS: ICD-10-CM

## 2025-04-08 DIAGNOSIS — L20.9 ATOPIC DERMATITIS, UNSPECIFIED: ICD-10-CM

## 2025-04-08 DIAGNOSIS — N32.81 OVERACTIVE BLADDER: ICD-10-CM

## 2025-04-08 PROCEDURE — 99204 OFFICE O/P NEW MOD 45 MIN: CPT

## 2025-04-08 PROCEDURE — 99203 OFFICE O/P NEW LOW 30 MIN: CPT | Mod: 25

## 2025-04-08 PROCEDURE — 99213 OFFICE O/P EST LOW 20 MIN: CPT | Mod: 25

## 2025-04-08 PROCEDURE — 95972 ALYS CPLX SP/PN NPGT W/PRGRM: CPT

## 2025-04-08 RX ORDER — TRIAMCINOLONE ACETONIDE 1 MG/G
0.1 CREAM TOPICAL
Qty: 454 | Refills: 0 | Status: ACTIVE | COMMUNITY
Start: 2025-04-08 | End: 1900-01-01

## 2025-04-11 ENCOUNTER — APPOINTMENT (OUTPATIENT)
Dept: INTERVENTIONAL RADIOLOGY/VASCULAR | Facility: HOSPITAL | Age: 70
End: 2025-04-11

## 2025-04-11 ENCOUNTER — OUTPATIENT (OUTPATIENT)
Dept: OUTPATIENT SERVICES | Facility: HOSPITAL | Age: 70
LOS: 1 days | End: 2025-04-11
Payer: COMMERCIAL

## 2025-04-11 DIAGNOSIS — Z96.0 PRESENCE OF UROGENITAL IMPLANTS: Chronic | ICD-10-CM

## 2025-04-11 DIAGNOSIS — Z98.89 OTHER SPECIFIED POSTPROCEDURAL STATES: Chronic | ICD-10-CM

## 2025-04-11 PROCEDURE — 38222 DX BONE MARROW BX & ASPIR: CPT

## 2025-04-11 PROCEDURE — 88311 DECALCIFY TISSUE: CPT

## 2025-04-11 PROCEDURE — 77002 NEEDLE LOCALIZATION BY XRAY: CPT

## 2025-04-11 PROCEDURE — 99152 MOD SED SAME PHYS/QHP 5/>YRS: CPT

## 2025-04-11 PROCEDURE — 88313 SPECIAL STAINS GROUP 2: CPT | Mod: 26

## 2025-04-11 PROCEDURE — 77002 NEEDLE LOCALIZATION BY XRAY: CPT | Mod: 26

## 2025-04-11 PROCEDURE — 88313 SPECIAL STAINS GROUP 2: CPT

## 2025-04-11 PROCEDURE — 88342 IMHCHEM/IMCYTCHM 1ST ANTB: CPT

## 2025-04-11 PROCEDURE — C1830: CPT

## 2025-04-11 PROCEDURE — 38222 DX BONE MARROW BX & ASPIR: CPT | Mod: LT

## 2025-04-11 PROCEDURE — 88341 IMHCHEM/IMCYTCHM EA ADD ANTB: CPT

## 2025-04-11 PROCEDURE — 88342 IMHCHEM/IMCYTCHM 1ST ANTB: CPT | Mod: 26,59

## 2025-04-11 PROCEDURE — 88305 TISSUE EXAM BY PATHOLOGIST: CPT | Mod: 26

## 2025-04-11 PROCEDURE — 88305 TISSUE EXAM BY PATHOLOGIST: CPT

## 2025-04-11 PROCEDURE — 85097 BONE MARROW INTERPRETATION: CPT

## 2025-04-11 PROCEDURE — 88341 IMHCHEM/IMCYTCHM EA ADD ANTB: CPT | Mod: 26,59

## 2025-04-11 PROCEDURE — 88189 FLOWCYTOMETRY/READ 16 & >: CPT

## 2025-04-11 PROCEDURE — 88311 DECALCIFY TISSUE: CPT | Mod: 26

## 2025-04-11 PROCEDURE — 99153 MOD SED SAME PHYS/QHP EA: CPT

## 2025-04-25 ENCOUNTER — NON-APPOINTMENT (OUTPATIENT)
Age: 70
End: 2025-04-25

## 2025-04-30 ENCOUNTER — APPOINTMENT (OUTPATIENT)
Dept: PULMONOLOGY | Facility: CLINIC | Age: 70
End: 2025-04-30
Payer: MEDICARE

## 2025-04-30 VITALS
HEART RATE: 60 BPM | HEIGHT: 62 IN | OXYGEN SATURATION: 93 % | WEIGHT: 160 LBS | DIASTOLIC BLOOD PRESSURE: 100 MMHG | BODY MASS INDEX: 29.44 KG/M2 | SYSTOLIC BLOOD PRESSURE: 150 MMHG | TEMPERATURE: 98.2 F

## 2025-04-30 DIAGNOSIS — J45.909 UNSPECIFIED ASTHMA, UNCOMPLICATED: ICD-10-CM

## 2025-04-30 PROCEDURE — 94010 BREATHING CAPACITY TEST: CPT

## 2025-04-30 PROCEDURE — 95012 NITRIC OXIDE EXP GAS DETER: CPT

## 2025-04-30 PROCEDURE — 99203 OFFICE O/P NEW LOW 30 MIN: CPT | Mod: 25

## 2025-04-30 PROCEDURE — 99213 OFFICE O/P EST LOW 20 MIN: CPT | Mod: 25

## 2025-04-30 RX ORDER — BUDESONIDE AND FORMOTEROL FUMARATE DIHYDRATE 80; 4.5 UG/1; UG/1
80-4.5 AEROSOL RESPIRATORY (INHALATION) TWICE DAILY
Qty: 1 | Refills: 3 | Status: ACTIVE | COMMUNITY
Start: 2025-04-30 | End: 1900-01-01

## 2025-07-08 ENCOUNTER — APPOINTMENT (OUTPATIENT)
Dept: UROLOGY | Facility: CLINIC | Age: 70
End: 2025-07-08
Payer: MEDICARE

## 2025-07-08 VITALS
SYSTOLIC BLOOD PRESSURE: 167 MMHG | WEIGHT: 160 LBS | HEART RATE: 64 BPM | BODY MASS INDEX: 29.44 KG/M2 | DIASTOLIC BLOOD PRESSURE: 101 MMHG | TEMPERATURE: 97.9 F | OXYGEN SATURATION: 99 % | HEIGHT: 62 IN

## 2025-07-08 VITALS
TEMPERATURE: 98.1 F | OXYGEN SATURATION: 98 % | WEIGHT: 160 LBS | SYSTOLIC BLOOD PRESSURE: 116 MMHG | BODY MASS INDEX: 29.44 KG/M2 | DIASTOLIC BLOOD PRESSURE: 78 MMHG | HEIGHT: 62 IN | HEART RATE: 79 BPM

## 2025-07-08 PROCEDURE — 99213 OFFICE O/P EST LOW 20 MIN: CPT | Mod: 25

## 2025-07-08 PROCEDURE — 95971 ALYS SMPL SP/PN NPGT W/PRGRM: CPT

## (undated) DEVICE — VENODYNE/SCD SLEEVE CALF MEDIUM

## (undated) DEVICE — SUT MONOCRYL 4-0 18" PS-2

## (undated) DEVICE — DRSG SUPPORTER ADULT 3" WAISTBAND LRG

## (undated) DEVICE — SUPP ATHLETIC MALE XLG 44-55IN

## (undated) DEVICE — SUT VICRYL 2-0 27" SH

## (undated) DEVICE — PREP CHLORAPREP HI-LITE ORANGE 26ML

## (undated) DEVICE — PACK GENERAL MINOR

## (undated) DEVICE — SUT PROLENE 2-0 30" CT-2

## (undated) DEVICE — STAPLER SKIN VISI-STAT 35 WIDE

## (undated) DEVICE — GLV 7.5 PROTEXIS (WHITE)

## (undated) DEVICE — SUT VICRYL 2-0 27" CT-2 UNDYED

## (undated) DEVICE — POSITIONER FOAM EGG CRATE ULNAR 2PCS (PINK)

## (undated) DEVICE — DRSG DERMABOND 0.7ML

## (undated) DEVICE — DRSG SUPPORTER ADULT 3" WAISTBAND MED

## (undated) DEVICE — GOWN XL